# Patient Record
Sex: MALE | Race: BLACK OR AFRICAN AMERICAN | RURAL
[De-identification: names, ages, dates, MRNs, and addresses within clinical notes are randomized per-mention and may not be internally consistent; named-entity substitution may affect disease eponyms.]

---

## 2017-06-02 DIAGNOSIS — E11.9 TYPE 2 DIABETES MELLITUS WITHOUT COMPLICATION, WITH LONG-TERM CURRENT USE OF INSULIN (HCC): ICD-10-CM

## 2017-06-02 DIAGNOSIS — E11.9 TYPE 2 DIABETES MELLITUS WITHOUT COMPLICATION, UNSPECIFIED LONG TERM INSULIN USE STATUS: ICD-10-CM

## 2017-06-02 DIAGNOSIS — Z79.4 TYPE 2 DIABETES MELLITUS WITHOUT COMPLICATION, WITH LONG-TERM CURRENT USE OF INSULIN (HCC): ICD-10-CM

## 2017-06-07 ENCOUNTER — OFFICE VISIT (OUTPATIENT)
Dept: FAMILY MEDICINE CLINIC | Age: 82
End: 2017-06-07

## 2017-06-07 VITALS
RESPIRATION RATE: 16 BRPM | HEART RATE: 67 BPM | OXYGEN SATURATION: 98 % | DIASTOLIC BLOOD PRESSURE: 66 MMHG | WEIGHT: 201 LBS | TEMPERATURE: 98.3 F | HEIGHT: 69 IN | SYSTOLIC BLOOD PRESSURE: 138 MMHG | BODY MASS INDEX: 29.77 KG/M2

## 2017-06-07 DIAGNOSIS — Z13.31 SCREENING FOR DEPRESSION: ICD-10-CM

## 2017-06-07 DIAGNOSIS — I10 ESSENTIAL HYPERTENSION: ICD-10-CM

## 2017-06-07 DIAGNOSIS — E78.00 PURE HYPERCHOLESTEROLEMIA: ICD-10-CM

## 2017-06-07 DIAGNOSIS — Z13.39 SCREENING FOR ALCOHOLISM: ICD-10-CM
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
## 2017-06-07 DIAGNOSIS — E11.9 TYPE 2 DIABETES MELLITUS WITHOUT COMPLICATION, UNSPECIFIED LONG TERM INSULIN USE STATUS: ICD-10-CM

## 2017-06-07 DIAGNOSIS — Z12.5 SCREENING FOR PROSTATE CANCER: ICD-10-CM

## 2017-06-07 DIAGNOSIS — Z00.00 ROUTINE GENERAL MEDICAL EXAMINATION AT A HEALTH CARE FACILITY: ICD-10-CM

## 2017-06-07 NOTE — PROGRESS NOTES
Reviewed record in preparation for visit and have necessary documentation  Pt did not bring medication to office visit for review  Opportunity was given for questions  Goals that were addressed and/or need to be completed after this appointment include   Health Maintenance Due   Topic Date Due    EYE EXAM RETINAL OR DILATED Q1  06/04/2016    FOOT EXAM Q1  03/09/2017    MEDICARE YEARLY EXAM  03/10/2017    HEMOGLOBIN A1C Q6M  04/25/2017    GLAUCOMA SCREENING Q2Y  06/04/2017

## 2017-06-07 NOTE — MR AVS SNAPSHOT
Visit Information Date & Time Provider Department Dept. Phone Encounter #  
 6/7/2017  8:00 AM Chantal Christopher MD [address] [id number] Upcoming Health Maintenance Date Due  
 EYE EXAM RETINAL OR DILATED Q1 6/4/2016 FOOT EXAM Q1 3/9/2017 MEDICARE YEARLY EXAM 3/10/2017 HEMOGLOBIN A1C Q6M 4/25/2017 GLAUCOMA SCREENING Q2Y 6/4/2017 INFLUENZA AGE 9 TO ADULT 8/1/2017 MICROALBUMIN Q1 10/25/2017 LIPID PANEL Q1 10/25/2017 DTaP/Tdap/Td series (2 - Td) 7/6/2026 Allergies as of 6/7/2017  Review Complete On: 6/7/2017 By: Kojo Hubbard LPN No Known Allergies Current Immunizations  Reviewed on 10/1/2010 Name Date Influenza Vaccine (Quad) PF 10/25/2016 Pneumococcal Polysaccharide (PPSV-23) 3/9/2016 Pneumococcal Vaccine (Unspecified Type) 8/17/1998 TD Vaccine 8/17/1998 Tdap 7/6/2016 Not reviewed this visit You Were Diagnosed With   
  
 Codes Comments Falls, initial encounter    -  Primary ICD-10-CM: W19. Christella Blush ICD-9-CM: E888.9 Type 2 diabetes mellitus without complication, unspecified long term insulin use status     ICD-10-CM: E11.9 ICD-9-CM: 250.00 Essential hypertension     ICD-10-CM: I10 
ICD-9-CM: 401.9 Pure hypercholesterolemia     ICD-10-CM: E78.00 ICD-9-CM: 272.0 Routine general medical examination at a health care facility     ICD-10-CM: Z00.00 ICD-9-CM: V70.0 Screening for alcoholism     ICD-10-CM: Z13.89 ICD-9-CM: V79.1 Screening for prostate cancer     ICD-10-CM: Z12.5 ICD-9-CM: V76.44 Vitals BP Pulse Temp Resp Height(growth percentile) Weight(growth percentile)  
 138/66 (BP 1 Location: Right arm, BP Patient Position: Sitting) 67 98.3 °F (36.8 °C) (Oral) 16 5' 9\" (1.753 m) 201 lb (91.2 kg) SpO2 BMI Smoking Status 98% 29.68 kg/m2 Never Smoker Vitals History BMI and BSA Data Body Mass Index Body Surface Area 29.68 kg/m 2 2.11 m 2 Preferred Pharmacy Pharmacy Name Phone [address] [facility] [address] [phone] Your Updated Medication List  
  
   
This list is accurate as of: 6/7/17  9:05 AM.  Always use your most recent med list.  
  
  
  
  
 aspirin 325 mg tablet Commonly known as:  ASPIRIN Take 1 Tab by mouth daily. glipiZIDE SR 10 mg CR tablet Commonly known as:  GLUCOTROL XL  
TAKE 1 TABLET DAILY pravastatin 20 mg tablet Commonly known as:  PRAVACHOL  
TAKE 1 TABLET DAILY  
  
 sildenafil citrate 50 mg tablet Commonly known as:  VIAGRA Take 1 Tab by mouth as needed. SITagliptin-metFORMIN 50-1,000 mg per tablet Commonly known as:  JANUMET  
TAKE 1 TABLET TWICE A DAY WITH MEALS Prescriptions Sent to Pharmacy Refills SITagliptin-metFORMIN (JANUMET) 50-1,000 mg per tablet 0 Sig: TAKE 1 TABLET TWICE A DAY WITH MEALS Class: Normal  
 Pharmacy: [address] [place] [address] #: [phone] We Performed the Following AMB POC EKG ROUTINE W/ 12 LEADS, INTER & REP [48454 CPT(R)] CBC W/O DIFF [49985 CPT(R)] HEMOGLOBIN A1C WITH EAG [77363 CPT(R)] LIPID PANEL [95023 CPT(R)] METABOLIC PANEL, COMPREHENSIVE [62733 CPT(R)] PSA SCREENING (SCREENING) [ [facility]] TSH 3RD GENERATION [26140 CPT(R)] Patient Instructions Advance Directives: Care Instructions Your Care Instructions An advance directive is a legal way to state your wishes at the end of your life. It tells your family and your doctor what to do if you can no longer say what you want. There are two main types of advance directives. You can change them any time that your wishes change. · A living will tells your family and your doctor your wishes about life support and other treatment.  
· A durable power of  for health care lets you name a person to make treatment decisions for you when you can't speak for yourself. This person is called a health care agent. If you do not have an advance directive, decisions about your medical care may be made by a doctor or a  who doesn't know you. It may help to think of an advance directive as a gift to the people who care for you. If you have one, they won't have to make tough decisions by themselves. Follow-up care is a key part of your treatment and safety. Be sure to make and go to all appointments, and call your doctor if you are having problems. It's also a good idea to know your test results and keep a list of the medicines you take. How can you care for yourself at home? · Discuss your wishes with your loved ones and your doctor. This way, there are no surprises. · Many states have a unique form. Or you might use a universal form that has been approved by many states. This kind of form can sometimes be completed and stored online. Your electronic copy will then be available wherever you have a connection to the Internet. In most cases, doctors will respect your wishes even if you have a form from a different state. · You don't need a  to do an advance directive. But you may want to get legal advice. · Think about these questions when you prepare an advance directive: ¨ Who do you want to make decisions about your medical care if you are not able to? Many people choose a family member or close friend. ¨ Do you know enough about life support methods that might be used? If not, talk to your doctor so you understand. ¨ What are you most afraid of that might happen? You might be afraid of having pain, losing your independence, or being kept alive by machines. ¨ Where would you prefer to die? Choices include your home, a hospital, or a nursing home. ¨ Would you like to have information about hospice care to support you and your family? ¨ Do you want to donate organs when you die? ¨ Do you want certain Sabianism practices performed before you die? If so, put your wishes in the advance directive. · Read your advance directive every year, and make changes as needed. When should you call for help? Be sure to contact your doctor if you have any questions. Where can you learn more? Go to http://sepideh-sol.info/. Enter R264 in the search box to learn more about \"Advance Directives: Care Instructions. \" Current as of: November 17, 2016 Content Version: 11.2 © 5163-1867 [employer]. Care instructions adapted under license by [employer] (which disclaims liability or warranty for this information). If you have questions about a medical condition or this instruction, always ask your healthcare professional. [address] any warranty or liability for your use of this information. Introducing [facility]! Kang Paredes introduces [employer] patient portal. Now you can access parts of your medical record, email your doctor's office, and request medication refills online. 1. In your internet browser, go to [email or web address] 2. Click on the First Time User? Click Here link in the Sign In box. You will see the New Member Sign Up page. 3. Enter your [employer] Access Code exactly as it appears below. You will not need to use this code after youve completed the sign-up process. If you do not sign up before the expiration date, you must request a new code. · [employer] Access Code: [email or web address] Expires: 9/5/2017  7:58 AM 
 
4. Enter the last four digits of your Social Security Number (xxxx) and Date of Birth (mm/dd/yyyy) as indicated and click Submit. You will be taken to the next sign-up page. 5. Create a [employer] ID. This will be your [employer] login ID and cannot be changed, so think of one that is secure and easy to remember. 6. Create a [employer] password. You can change your password at any time. 7. Enter your Password Reset Question and Answer. This can be used at a later time if you forget your password. 8. Enter your e-mail address. You will receive e-mail notification when new information is available in [address] 9. Click Sign Up. You can now view and download portions of your medical record. 10. Click the Download Summary menu link to download a portable copy of your medical information. If you have questions, please visit the Frequently Asked Questions section of the [employer] website. Remember, [employer] is NOT to be used for urgent needs. For medical emergencies, dial 911. Now available from your iPhone and Android! Please provide this summary of care documentation to your next provider. Your primary care clinician is listed as Barry Rosado. If you have any questions after today's visit, please call [phone].

## 2017-06-07 NOTE — PROGRESS NOTES
This is a Subsequent Medicare Annual Wellness Visit providing Personalized Prevention Plan Services (PPPS) (Performed 12 months after initial AWV and PPPS )    I have reviewed the patient's medical history in detail and updated the computerized patient record. 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 90 Tab 3    glipiZIDE SR (GLUCOTROL XL) 10 mg CR tablet TAKE 1 TABLET DAILY 90 Tab 1    sildenafil citrate (VIAGRA) 50 mg tablet Take 1 Tab by mouth as needed. 3 Tab 32     No Known Allergies  Family History   Problem Relation Age of Onset    No Known Problems Mother     No Known Problems Father      Social History   Substance Use Topics    Smoking status: Never Smoker    Smokeless tobacco: Never Used    Alcohol use No     Patient Active Problem List   Diagnosis Code    DM type 2 [facility]diabetes mellitus, type 2) [facility] E11.9    HTN (hypertension) I10    Hyperlipemia E78.5    Peyronie disease N48.6    Anemia D64.9    PPD positive R76.11    Colon polyp K63.5       Depression Risk Factor Screening:     PHQ over the last two weeks 10/25/2016   Little interest or pleasure in doing things Not at all   Feeling down, depressed or hopeless Not at all   Total Score PHQ 2 0     Alcohol Risk Factor Screening:    On any occasion during the past 3 months, have you had more than 4 drinks containing alcohol? No    Do you average more than 14 drinks per week? No      Functional Ability and Level of Safety:     Hearing Loss   moderate    Activities of Daily Living   Self-care. Requires assistance with: no ADLs    Fall Risk     Fall Risk Assessment, last 12 mths 10/25/2016   Able to walk? Yes   Fall in past 12 months? No     Abuse Screen   Patient is not abused      Evaluation of Cognitive Function:  Mood/affect:  neutral  Appearance: age appropriate and casually dressed  Family member/caregiver input: none      Patient Care Team:  Princess Kiah DO as PCP - General (Family Practice)  Grecia Bach MD (Internal Medicine)    Advice/Referrals/Counseling   Education and counseling provided:  Are appropriate based on today's review and evaluation  End-of-Life planning (with patient's consent)      Assessment/Plan       ICD-10-CM ICD-9-CM    1. Routine general medical examination at a health care facility Z00.00 V70.0    2. Screening for alcoholism Z13.89 V79.1 GA ANNUAL ALCOHOL SCREEN 15 MIN   3. Screening for depression Z13.89 V79.0    4. Screening for prostate cancer Z12.5 V76.44 PSA SCREENING (SCREENING)   . The following medical evaluation and decision making is distinct and separate from the Annual Medicare Wellness Exam.      Patient: Trung Alexander MRN: [id number]  SSN: [id number]    YOB: 1931  Age: 80 y.o. Sex: male        Subjective:     Chief Complaint   Patient presents with    Diabetes    Cholesterol Problem    Hypertension    Annual Wellness Visit     due       HPI: he is a 80y.o. year old male who presents for with concern about recent falls. Patient with hx of DM2, HTN, HLD and anemia. Patient denies HA, dizziness, SOB, CP, abdominal pain, dysuria, myalgias or arthralgias. Diabetes: This patient is being treating under a comprehensive plan of care for diabetes.   Overall the patient feels well with good energy level. Insulin dependence: no   Pertinent Labs:   Lab Results   Component Value Date/Time    Hemoglobin A1c 7.9 06/07/2017 12:00 PM      Body mass index is 29.68 kg/(m^2). Lab Results   Component Value Date/Time    LDL, calculated 79 06/07/2017 12:00 PM        Dietary compliance: Good   Medication compliance:Good   On ASA: Yes        Wt Readings from Last 3 Encounters:   06/07/17 201 lb (91.2 kg)   10/25/16 195 lb (88.5 kg)   06/23/16 201 lb 3.2 oz (91.3 kg)        History   Smoking Status    Never Smoker   Smokeless Tobacco    Never Used        Medications, diet and exercise as means of diabetic control with a goal of an A1C of less than 7.0% discussed. Diabetic foot care and annual eye exam discussed as well. Check blood sugars while fasting just before breakfast on most days and occasionally before dinner. Write down readings in a diabetic log book and bring them to the next visit. Call the office for fasting sugars over 200 or below 75 on two or more occasions. Call immediately if having symptoms of high sugar (frequent urination, always thirsty) or low sugar (dizzy, lethargic, sweaty, nauseated, headache). Our overall goal is to reduce or eliminate the long term consequences of poorly controlled diabetes. Patient expresses understanding and agreement with our plan of care. Hypertension:  The patient reports:  taking medications as instructed, no medication side effects noted, no TIA's, no chest pain on exertion, no dyspnea on exertion, no swelling of ankles.     Lifestyle modification/social history: sedentary     BP Readings from Last 3 Encounters:   06/07/17 138/66   10/25/16 111/50   06/23/16 107/70     Lab Results   Component Value Date/Time    Sodium 137 06/07/2017 12:00 PM    Potassium 4.6 06/07/2017 12:00 PM    Chloride 95 06/07/2017 12:00 PM    CO2 24 06/07/2017 12:00 PM    Anion gap 7 11/02/2010 12:20 PM    Glucose 154 06/07/2017 12:00 PM    BUN 12 06/07/2017 12:00 PM    Creatinine 0.88 06/07/2017 12:00 PM    BUN/Creatinine ratio 14 06/07/2017 12:00 PM    GFR est AA 91 06/07/2017 12:00 PM    GFR est non-AA 78 06/07/2017 12:00 PM    Calcium 9.0 06/07/2017 12:00 PM     Patient advised to log blood pressures at home 2-3 times weekly and bring to next visit. Call office as soon as possible if BP's over 140/90 on multiple occasions or with symptoms of dizziness, chest pain, shortness of breath, headache or ankle swelling. Our goal is to normalize the blood pressure to decrease the risks of strokes and heart attacks. The patient is in agreement with the plan. Current and past medical information:    Current Medications after this visit[de-identified]     Current Outpatient Prescriptions   Medication Sig    SITagliptin-metFORMIN (JANUMET) 50-1,000 mg per tablet TAKE 1 TABLET TWICE A DAY WITH MEALS    pravastatin (PRAVACHOL) 20 mg tablet TAKE 1 TABLET DAILY    aspirin (ASPIRIN) 325 mg tablet Take 1 Tab by mouth daily.  glipiZIDE SR (GLUCOTROL XL) 10 mg CR tablet TAKE 1 TABLET DAILY    sildenafil citrate (VIAGRA) 50 mg tablet Take 1 Tab by mouth as needed. No current facility-administered medications for this visit.         Patient Active Problem List    Diagnosis Date Noted    DM type 2 (diabetes mellitus, type 2) ([facility] 04/09/2010    HTN (hypertension) 04/09/2010    Hyperlipemia 04/09/2010    Peyronie disease 04/09/2010    Anemia 04/09/2010    PPD positive 04/09/2010    Colon polyp 04/09/2010       Past Medical History:   Diagnosis Date    Anemia 4/9/2010    Colon polyp 4/9/2010    DM type 2 (diabetes mellitus, type 2) ([facility] 4/9/2010    HTN (hypertension) 4/9/2010    Hyperlipemia 4/9/2010    Peyronie disease 4/9/2010    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       No Known Allergies    Past Surgical History:   Procedure Laterality Date    COLONOSCOPY      ENDOSCOPY, COLON, DIAGNOSTIC  2007    Patient instructed to return in 3 Years/over-due    HX HEENT Social History     Social History    Marital status:      Spouse name: N/A    Number of children: N/A    Years of education: N/A     Social History Main Topics    Smoking status: Never Smoker    Smokeless tobacco: Never Used    Alcohol use No    Drug use: No    Sexual activity: Not Asked     Other Topics Concern    None     Social History Narrative         Objective:     Review of Systems:  Constitutional: Negative for fatigue or malaise  Derm: Negative for rash or lesion  HEENT: Negative for acute hearing or vision changes  Cardiovascular: Negative for dizziness, chest pain or palpitations  Respiratory: Negative for cough, wheezing or SOB  Gastreintestinal: Negative for nausea or abdominal pain  Genital/urinary: Negative for dysuria or voiding dysfunction  Muscoloskeletal: Negative for acute myalgias or arthralgias   Neurological: Negative for headache, weakness or paresthesia  Psychological: Negative for depression or anxiety      Vitals:    06/07/17 0808   BP: 138/66   Pulse: 67   Resp: 16   Temp: 98.3 °F (36.8 °C)   TempSrc: Oral   SpO2: 98%   Weight: 201 lb (91.2 kg)   Height: 5' 9\" (1.753 m)      Body mass index is 29.68 kg/(m^2).     Physical Exam:  Constitutional: well developed, well nourished, in no acute distress  Skin: warm and dry, normal tone and turgor  Head: normocephalic, atraumatic  Eyes: sclera clear, EOMI, PERRL  Neck: normal range of motion  Cardiovascular: normal S1, S2, regular rate and rhythm  Respiratory: clear to auscultation bilaterally with symmetrical effort  Abdomen: soft, BS normal  Extremities: full range of motion  Neurology: normal strength and sensation  Psych: active, alert and oriented, affect appropriate       Health Maintenance Due   Topic Date Due    EYE EXAM RETINAL OR DILATED Q1  06/04/2016    FOOT EXAM Q1  03/09/2017    MEDICARE YEARLY EXAM  03/10/2017    GLAUCOMA SCREENING Q2Y  06/04/2017       Risk, benefits and potential costs of recommended health maintenance discussed. Patient expressed understanding and declined at this time. Assessment and orders:       ICD-10-CM ICD-9-CM    1. Falls, initial encounter W19. XXXA E888.9 AMB POC EKG ROUTINE W/ 12 LEADS, INTER & REP      METABOLIC PANEL, COMPREHENSIVE      CBC W/O DIFF   2. Type 2 diabetes mellitus without complication, unspecified long term insulin use status M76.1 290.08 METABOLIC PANEL, COMPREHENSIVE      CBC W/O DIFF      HEMOGLOBIN A1C WITH EAG      LIPID PANEL      SITagliptin-metFORMIN (JANUMET) 50-1,000 mg per tablet      DISCONTINUED: SITagliptin-metFORMIN (JANUMET) 50-1,000 mg per tablet   3. Essential hypertension I10 401.9 AMB POC EKG ROUTINE W/ 12 LEADS, INTER & REP      METABOLIC PANEL, COMPREHENSIVE      LIPID PANEL      TSH 3RD GENERATION   4. Pure hypercholesterolemia C37.87 229.3 METABOLIC PANEL, COMPREHENSIVE      LIPID PANEL         Plan of care:  Diagnoses were discussed in detail with patient. Medication risks/benefits/side effects discussed with patient. All of the patient's questions were addressed and answered to apparent satisfaction. The patient understands and agrees with our plan of care. The patient knows to call back if they have questions about the plan of care or if symptoms change. The patient received an After-Visit Summary which contains VS, diagnoses, orders, allergy and medication lists. Patient Care Team:  Raissa Russo DO as PCP - General (Family Practice)  Piyush Pavon MD (Internal Medicine)    Follow-up Disposition:  Return in about 3 months (around 9/7/2017), or if symptoms worsen or fail to improve.     Future Appointments  Date Time Provider Otoniel Kaplan   10/5/2017 8:40 AM Stella Matthews MD [facility] REGINO SCHED       Signed By: Stella Matthews MD     June 17, 2017

## 2017-06-07 NOTE — PATIENT INSTRUCTIONS
Advance Directives: Care Instructions  Your Care Instructions  An advance directive is a legal way to state your wishes at the end of your life. It tells your family and your doctor what to do if you can no longer say what you want. There are two main types of advance directives. You can change them any time that your wishes change. · A living will tells your family and your doctor your wishes about life support and other treatment. · A durable power of  for health care lets you name a person to make treatment decisions for you when you can't speak for yourself. This person is called a health care agent. If you do not have an advance directive, decisions about your medical care may be made by a doctor or a  who doesn't know you. It may help to think of an advance directive as a gift to the people who care for you. If you have one, they won't have to make tough decisions by themselves. Follow-up care is a key part of your treatment and safety. Be sure to make and go to all appointments, and call your doctor if you are having problems. It's also a good idea to know your test results and keep a list of the medicines you take. How can you care for yourself at home? · Discuss your wishes with your loved ones and your doctor. This way, there are no surprises. · Many states have a unique form. Or you might use a universal form that has been approved by many states. This kind of form can sometimes be completed and stored online. Your electronic copy will then be available wherever you have a connection to the Internet. In most cases, doctors will respect your wishes even if you have a form from a different state. · You don't need a  to do an advance directive. But you may want to get legal advice. · Think about these questions when you prepare an advance directive:  ¨ Who do you want to make decisions about your medical care if you are not able to?  Many people choose a family member or close friend. ¨ Do you know enough about life support methods that might be used? If not, talk to your doctor so you understand. ¨ What are you most afraid of that might happen? You might be afraid of having pain, losing your independence, or being kept alive by machines. ¨ Where would you prefer to die? Choices include your home, a hospital, or a nursing home. ¨ Would you like to have information about hospice care to support you and your family? ¨ Do you want to donate organs when you die? ¨ Do you want certain Hinduism practices performed before you die? If so, put your wishes in the advance directive. · Read your advance directive every year, and make changes as needed. When should you call for help? Be sure to contact your doctor if you have any questions. Where can you learn more? Go to http://sepideh-sol.info/. Enter R264 in the search box to learn more about \"Advance Directives: Care Instructions. \"  Current as of: November 17, 2016  Content Version: 11.2  © 3478-5874 [employer]. Care instructions adapted under license by [employer] (which disclaims liability or warranty for this information). If you have questions about a medical condition or this instruction, always ask your healthcare professional. [address] any warranty or liability for your use of this information.

## 2017-06-08 LAB
ALBUMIN SERPL-MCNC: 4.2 G/DL (ref 3.5–4.7)
ALBUMIN/GLOB SERPL: 1.6 {RATIO} (ref 1.2–2.2)
ALP SERPL-CCNC: 78 IU/L (ref 39–117)
ALT SERPL-CCNC: 11 IU/L (ref 0–44)
AST SERPL-CCNC: 13 IU/L (ref 0–40)
BILIRUB SERPL-MCNC: 0.3 MG/DL (ref 0–1.2)
BUN SERPL-MCNC: 12 MG/DL (ref 8–27)
BUN/CREAT SERPL: 14 (ref 10–24)
CALCIUM SERPL-MCNC: 9 MG/DL (ref 8.6–10.2)
CHLORIDE SERPL-SCNC: 95 MMOL/L (ref 96–106)
CHOLEST SERPL-MCNC: 134 MG/DL (ref 100–199)
CO2 SERPL-SCNC: 24 MMOL/L (ref 18–29)
CREAT SERPL-MCNC: 0.88 MG/DL (ref 0.76–1.27)
ERYTHROCYTE [DISTWIDTH] IN BLOOD BY AUTOMATED COUNT: 13.9 % (ref 12.3–15.4)
EST. AVERAGE GLUCOSE BLD GHB EST-MCNC: 180 MG/DL
GLOBULIN SER CALC-MCNC: 2.7 G/DL (ref 1.5–4.5)
GLUCOSE SERPL-MCNC: 154 MG/DL (ref 65–99)
HBA1C MFR BLD: 7.9 % (ref 4.8–5.6)
HCT VFR BLD AUTO: 35.8 % (ref 37.5–51)
HDLC SERPL-MCNC: 44 MG/DL
HGB BLD-MCNC: 11.7 G/DL (ref 12.6–17.7)
LDLC SERPL CALC-MCNC: 79 MG/DL (ref 0–99)
MCH RBC QN AUTO: 27.1 PG (ref 26.6–33)
MCHC RBC AUTO-ENTMCNC: 32.7 G/DL (ref 31.5–35.7)
MCV RBC AUTO: 83 FL (ref 79–97)
PLATELET # BLD AUTO: 239 X10E3/UL (ref 150–379)
POTASSIUM SERPL-SCNC: 4.6 MMOL/L (ref 3.5–5.2)
PROT SERPL-MCNC: 6.9 G/DL (ref 6–8.5)
PSA SERPL-MCNC: 1.5 NG/ML (ref 0–4)
RBC # BLD AUTO: 4.32 X10E6/UL (ref 4.14–5.8)
SODIUM SERPL-SCNC: 137 MMOL/L (ref 134–144)
TRIGL SERPL-MCNC: 54 MG/DL (ref 0–149)
TSH SERPL DL<=0.005 MIU/L-ACNC: 1.53 UIU/ML (ref 0.45–4.5)
VLDLC SERPL CALC-MCNC: 11 MG/DL (ref 5–40)
WBC # BLD AUTO: 7.2 X10E3/UL (ref 3.4–10.8)

## 2017-10-09 ENCOUNTER — OFFICE VISIT (OUTPATIENT)
Dept: FAMILY MEDICINE CLINIC | Age: 82
End: 2017-10-09

## 2017-10-09 VITALS
TEMPERATURE: 98.2 F | RESPIRATION RATE: 20 BRPM | SYSTOLIC BLOOD PRESSURE: 120 MMHG | WEIGHT: 204.4 LBS | BODY MASS INDEX: 30.27 KG/M2 | HEIGHT: 69 IN | OXYGEN SATURATION: 97 % | DIASTOLIC BLOOD PRESSURE: 66 MMHG | HEART RATE: 66 BPM

## 2017-10-09 DIAGNOSIS — E78.2 MIXED HYPERLIPIDEMIA: ICD-10-CM

## 2017-10-09 DIAGNOSIS — E11.9 CONTROLLED TYPE 2 DIABETES MELLITUS WITHOUT COMPLICATION, WITHOUT LONG-TERM CURRENT USE OF INSULIN (HCC): Primary | ICD-10-CM

## 2017-10-09 DIAGNOSIS — I10 ESSENTIAL HYPERTENSION: ICD-10-CM

## 2017-10-09 RX ORDER — PRAVASTATIN SODIUM 20 MG/1
TABLET ORAL
Qty: 90 TAB | Refills: 0 | Status: SHIPPED | OUTPATIENT
Start: 2017-10-09 | End: 2017-10-09 | Stop reason: SDUPTHER

## 2017-10-09 RX ORDER — PRAVASTATIN SODIUM 20 MG/1
TABLET ORAL
Qty: 90 TAB | Refills: 1 | Status: SHIPPED | OUTPATIENT
Start: 2017-10-09 | End: 2018-01-01 | Stop reason: SDUPTHER

## 2017-10-09 RX ORDER — GLIPIZIDE 10 MG/1
TABLET, FILM COATED, EXTENDED RELEASE ORAL
Qty: 90 TAB | Refills: 1 | Status: SHIPPED | OUTPATIENT
Start: 2017-10-09 | End: 2018-05-15 | Stop reason: SDUPTHER

## 2017-10-09 RX ORDER — ASPIRIN 325 MG
325 TABLET ORAL DAILY
Qty: 90 TAB | Refills: 3 | Status: SHIPPED | OUTPATIENT
Start: 2017-10-09 | End: 2018-02-06

## 2017-10-09 NOTE — PROGRESS NOTES
Health Maintenance Due   Topic Date Due    EYE EXAM RETINAL OR DILATED Q1  06/04/2016    FOOT EXAM Q1  03/09/2017    GLAUCOMA SCREENING Q2Y  06/04/2017    INFLUENZA AGE 9 TO ADULT  08/01/2017    MICROALBUMIN Q1  10/25/2017       Diabetic Bundle:  LDL-79  A1C-7.9  BP-  Smoking?no  Anticoagulation medication? yes  Eye exam dilated?   Foot exam?

## 2017-10-09 NOTE — MR AVS SNAPSHOT
Visit Information Date & Time Provider Department Dept. Phone Encounter #  
 10/9/2017  4:00 PM Josette Bernardo MD [address] [id number] Upcoming Health Maintenance Date Due  
 EYE EXAM RETINAL OR DILATED Q1 6/4/2016 FOOT EXAM Q1 3/9/2017 GLAUCOMA SCREENING Q2Y 6/4/2017 INFLUENZA AGE 9 TO ADULT 8/1/2017 MICROALBUMIN Q1 10/25/2017 HEMOGLOBIN A1C Q6M 12/7/2017 LIPID PANEL Q1 6/7/2018 MEDICARE YEARLY EXAM 6/8/2018 DTaP/Tdap/Td series (2 - Td) 7/6/2026 Allergies as of 10/9/2017  Review Complete On: 10/9/2017 By: Jahaira Ramos No Known Allergies Current Immunizations  Reviewed on 10/1/2010 Name Date Influenza Vaccine (Quad) PF 10/25/2016 Pneumococcal Polysaccharide (PPSV-23) 3/9/2016 TD Vaccine 8/17/1998 Tdap 7/6/2016 ZZZ-RETIRED (DO NOT USE) Pneumococcal Vaccine (Unspecified Type) 8/17/1998 Not reviewed this visit You Were Diagnosed With   
  
 Codes Comments Controlled type 2 diabetes mellitus without complication, without long-term current use of insulin ([facility] [address]    -  Primary ICD-10-CM: E11.9 ICD-9-CM: 250.00 Mixed hyperlipidemia     ICD-10-CM: E78.2 ICD-9-CM: 272.2 Essential hypertension     ICD-10-CM: I10 
ICD-9-CM: 401.9 Vitals BP Pulse Temp Resp Height(growth percentile) Weight(growth percentile) 120/66 66 98.2 °F (36.8 °C) (Oral) 20 5' 9\" (1.753 m) 204 lb 6.4 oz (92.7 kg) SpO2 BMI Smoking Status 97% 30.18 kg/m2 Never Smoker Vitals History BMI and BSA Data Body Mass Index Body Surface Area  
 30.18 kg/m 2 2.12 m 2 [facility]erred Pharm[facility] Phone [address] [phone] Your Updated Medication List  
  
   
This list is accurate as of: 10/9/17  4:56 PM.  Always use your most recent med list.  
  
  
  
  
 aspirin 325 mg tablet Commonly known as:  ASPIRIN Take 1 Tab by mouth daily. glipiZIDE SR 10 mg CR tablet Commonly known as:  GLUCOTROL XL  
TAKE 1 TABLET DAILY pravastatin 20 mg tablet Commonly known as:  PRAVACHOL  
TAKE 1 TABLET DAILY  
  
 sildenafil citrate 50 mg tablet Commonly known as:  VIAGRA Take 1 Tab by mouth as needed. SITagliptin-metFORMIN 50-1,000 mg per tablet Commonly known as:  JANUMET  
TAKE 1 TABLET TWICE A DAY WITH MEALS Prescriptions Sent to Pharmacy Refills  
 pravastatin (PRAVACHOL) 20 mg tablet 1 Sig: TAKE 1 TABLET DAILY Class: Normal  
 Pharmacy: [address], [address] Ph #: [phone]  
 glipiZIDE SR (GLUCOTROL XL) 10 mg CR tablet 1 Sig: TAKE 1 TABLET DAILY Class: Normal  
 Pharmacy: [address], [address] Ph #: [phone] SITagliptin-metFORMIN (JANUMET) 50-1,000 mg per tablet 1 Sig: TAKE 1 TABLET TWICE A DAY WITH MEALS Class: Normal  
 Pharmacy: [address], [address] Ph #: [phone]  
 aspirin (ASPIRIN) 325 mg tablet 3 Sig: Take 1 Tab by mouth daily. Class: Normal  
 Pharmacy: [address], [address] Ph #: [phone] Route: Oral  
  
Patient Instructions Learning About Diabetes Food Guidelines Your Care Instructions Meal planning is important to manage diabetes. It helps keep your blood sugar at a target level (which you set with your doctor). You don't have to eat special foods. You can eat what your family eats, including sweets once in a while. But you do have to pay attention to how often you eat and how much you eat of certain foods. You may want to work with a dietitian or a certified diabetes educator (CDE) to help you plan meals and snacks. A dietitian or CDE can also help you lose weight if that is one of your goals. What should you know about eating carbs? Managing the amount of carbohydrate (carbs) you eat is an important part of healthy meals when you have diabetes. Carbohydrate is found in many foods. · Learn which foods have carbs. And learn the amounts of carbs in different foods. ¨ Bread, cereal, pasta, and rice have about 15 grams of carbs in a serving. A serving is 1 slice of bread (1 ounce), ½ cup of cooked cereal, or 1/3 cup of cooked pasta or rice. ¨ Fruits have 15 grams of carbs in a serving. A serving is 1 small fresh fruit, such as an apple or orange; ½ of a banana; ½ cup of cooked or canned fruit; ½ cup of fruit juice; 1 cup of melon or raspberries; or 2 tablespoons of dried fruit. ¨ Milk and no-sugar-added yogurt have 15 grams of carbs in a serving. A serving is 1 cup of milk or 2/3 cup of no-sugar-added yogurt. ¨ Starchy vegetables have 15 grams of carbs in a serving. A serving is ½ cup of mashed potatoes or sweet potato; 1 cup winter squash; ½ of a small baked potato; ½ cup of cooked beans; or ½ cup cooked corn or green peas. · Learn how much carbs to eat each day and at each meal. A dietitian or CDE can teach you how to keep track of the amount of carbs you eat. This is called carbohydrate counting. · If you are not sure how to count carbohydrate grams, use the Plate Method to plan meals. It is a good, quick way to make sure that you have a balanced meal. It also helps you spread carbs throughout the day. ¨ Divide your plate by types of foods. Put non-starchy vegetables on half the plate, meat or other protein food on one-quarter of the plate, and a grain or starchy vegetable in the final quarter of the plate.  To this you can add a small piece of fruit and 1 cup of milk or yogurt, depending on how many carbs you are supposed to eat at a meal. 
· Try to eat about the same amount of carbs at each meal. Do not \"save up\" your daily allowance of carbs to eat at one meal. 
 · Proteins have very little or no carbs per serving. Examples of proteins are beef, chicken, turkey, fish, eggs, tofu, cheese, cottage cheese, and peanut butter. A serving size of meat is 3 ounces, which is about the size of a deck of cards. Examples of meat substitute serving sizes (equal to 1 ounce of meat) are 1/4 cup of cottage cheese, 1 egg, 1 tablespoon of peanut butter, and ½ cup of tofu. How can you eat out and still eat healthy? · Learn to estimate the serving sizes of foods that have carbohydrate. If you measure food at home, it will be easier to estimate the amount in a serving of restaurant food. · If the meal you order has too much carbohydrate (such as potatoes, corn, or baked beans), ask to have a low-carbohydrate food instead. Ask for a salad or green vegetables. · If you use insulin, check your blood sugar before and after eating out to help you plan how much to eat in the future. · If you eat more carbohydrate at a meal than you had planned, take a walk or do other exercise. This will help lower your blood sugar. What else should you know? · Limit saturated fat, such as the fat from meat and dairy products. This is a healthy choice because people who have diabetes are at higher risk of heart disease. So choose lean cuts of meat and nonfat or low-fat dairy products. Use olive or canola oil instead of butter or shortening when cooking. · Don't skip meals. Your blood sugar may drop too low if you skip meals and take insulin or certain medicines for diabetes. · Check with your doctor before you drink alcohol. Alcohol can cause your blood sugar to drop too low. Alcohol can also cause a bad reaction if you take certain diabetes medicines. Follow-up care is a key part of your treatment and safety. Be sure to make and go to all appointments, and call your doctor if you are having problems. It's also a good idea to know your test results and keep a list of the medicines you take. Where can you learn more? Go to http://sepideh-sol.info/. Enter S410 in the search box to learn more about \"Learning About Diabetes Food Guidelines. \" Current as of: March 13, 2017 Content Version: 11.3 © 4139-1198 [employer]. Care instructions adapted under license by [employer] (which disclaims liability or warranty for this information). If you have questions about a medical condition or this instruction, always ask your healthcare professional. [address] any warranty or liability for your use of this information. Introducing [facility]! Chiojason Corbin introduces [employer] patient portal. Now you can access parts of your medical record, email your doctor's office, and request medication refills online. 1. In your internet browser, go to [email or web address] 2. Click on the First Time User? Click Here link in the Sign In box. You will see the New Member Sign Up page. 3. Enter your [employer] Access Code exactly as it appears below. You will not need to use this code after youve completed the sign-up process. If you do not sign up before the expiration date, you must request a new code. · [employer] Access Code: [employer] Expires: 1/7/2018  4:11 PM 
 
4. Enter the last four digits of your Social Security Number (xxxx) and Date of Birth (mm/dd/yyyy) as indicated and click Submit. You will be taken to the next sign-up page. 5. Create a [employer] ID. This will be your [employer] login ID and cannot be changed, so think of one that is secure and easy to remember. 6. Create a [employer] password. You can change your password at any time. 7. Enter your Password Reset Question and Answer. This can be used at a later time if you forget your password. 8. Enter your e-mail address. You will receive e-mail notification when new information is available in [address] 9. Click Sign Up. You can now view and download portions of your medical record. 10. Click the Download Summary menu link to download a portable copy of your medical information. If you have questions, please visit the Frequently Asked Questions section of the [employer] website. Remember, [employer] is NOT to be used for urgent needs. For medical emergencies, dial 911. Now available from your iPhone and Android! Please provide this summary of care documentation to your next provider. If you have any questions after today's visit, please call [phone].

## 2017-10-09 NOTE — PATIENT INSTRUCTIONS

## 2017-10-15 DIAGNOSIS — E78.2 MIXED HYPERLIPIDEMIA: ICD-10-CM

## 2017-10-15 DIAGNOSIS — I10 ESSENTIAL HYPERTENSION: ICD-10-CM

## 2017-10-15 DIAGNOSIS — E11.9 CONTROLLED TYPE 2 DIABETES MELLITUS WITHOUT COMPLICATION, WITHOUT LONG-TERM CURRENT USE OF INSULIN (HCC): ICD-10-CM

## 2017-10-15 NOTE — PROGRESS NOTES
Patient: Cruz Jenkins MRN: [id number]  SSN: [id number]    YOB: 1931  Age: 80 y.o. Sex: male        Subjective:     Chief Complaint   Patient presents with    Hypertension    Diabetes    Cholesterol Problem    Labs       HPI: he is a 80y.o. year old male who presents for follow up Patient with hx of DM2, HTN, HLD and anemia. Patient denies HA, dizziness, SOB, CP, abdominal pain, dysuria, myalgias or arthralgias. Patient not fasting for lab work. Diabetes: This patient is being treating under a comprehensive plan of care for diabetes. Overall the patient feels well with good energy level. Insulin dependence: no   Pertinent Labs:     Lab Results   Component Value Date/Time    Hemoglobin A1c 7.9 06/07/2017 12:00 PM      Body mass index is 30.18 kg/(m^2). Lab Results   Component Value Date/Time    LDL, calculated 79 06/07/2017 12:00 PM        Dietary compliance: Good   Medication compliance:Good   On ASA: Yes        Wt Readings from Last 3 Encounters:   10/09/17 204 lb 6.4 oz (92.7 kg)   06/07/17 201 lb (91.2 kg)   10/25/16 195 lb (88.5 kg)        History   Smoking Status    Never Smoker   Smokeless Tobacco    Never Used        Medications, diet and exercise as means of diabetic control with a goal of an A1C of less than 7.0% discussed. Diabetic foot care and annual eye exam discussed as well. Check blood sugars while fasting just before breakfast on most days and occasionally before dinner. Write down readings in a diabetic log book and bring them to the next visit. Call the office for fasting sugars over 200 or below 75 on two or more occasions. Call immediately if having symptoms of high sugar (frequent urination, always thirsty) or low sugar (dizzy, lethargic, sweaty, nauseated, headache). Our overall goal is to reduce or eliminate the long term consequences of poorly controlled diabetes.  Patient expresses understanding and agreement with our plan of care.    Hypertension:  The patient reports:  taking medications as instructed, no medication side effects noted, no TIA's, no chest pain on exertion, no dyspnea on exertion, no swelling of ankles. Lifestyle modification/social history: sedentary     BP Readings from Last 3 Encounters:   10/09/17 120/66   06/07/17 138/66   10/25/16 111/50     Patient advised to log blood pressures at home 2-3 times weekly and bring to next visit. Call office as soon as possible if BP's over 140/90 on multiple occasions or with symptoms of dizziness, chest pain, shortness of breath, headache or ankle swelling. Our goal is to normalize the blood pressure to decrease the risks of strokes and heart attacks. The patient is in agreement with the plan. Current and past medical information:    Current Medications after this visit[de-identified]     Current Outpatient Prescriptions   Medication Sig    pravastatin (PRAVACHOL) 20 mg tablet TAKE 1 TABLET DAILY    glipiZIDE SR (GLUCOTROL XL) 10 mg CR tablet TAKE 1 TABLET DAILY    SITagliptin-metFORMIN (JANUMET) 50-1,000 mg per tablet TAKE 1 TABLET TWICE A DAY WITH MEALS    aspirin (ASPIRIN) 325 mg tablet Take 1 Tab by mouth daily.  sildenafil citrate (VIAGRA) 50 mg tablet Take 1 Tab by mouth as needed. No current facility-administered medications for this visit.         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       No Known Allergies    Past Surgical History:   Procedure Laterality Date    COLONOSCOPY      ENDOSCOPY, COLON, DIAGNOSTIC  2007    Patient instructed to return in 3 Years/over-due    HX HEENT         Social History     Social History    Marital status:      Spouse name: N/A    Number of children: N/A    Years of education: N/A     Social History Main Topics    Smoking status: Never Smoker    Smokeless tobacco: Never Used    Alcohol use No    Drug use: No    Sexual activity: Not Asked     Other Topics Concern    None     Social History Narrative         Objective:     Review of Systems:  Constitutional: Negative for fatigue or malaise  Derm: Negative for rash or lesion  HEENT: Negative for acute hearing or vision changes  Cardiovascular: Negative for dizziness, chest pain or palpitations  Respiratory: Negative for cough, wheezing or SOB  Gastreintestinal: Negative for nausea or abdominal pain  Genital/urinary: Negative for dysuria or voiding dysfunction  Muscoloskeletal: Negative for acute myalgias or arthralgias   Neurological: Negative for headache, weakness or paresthesia  Psychological: Negative for depression or anxiety      Vitals:    10/09/17 1614   BP: 120/66   Pulse: 66   Resp: 20   Temp: 98.2 °F (36.8 °C)   TempSrc: Oral   SpO2: 97%   Weight: 204 lb 6.4 oz (92.7 kg)   Height: 5' 9\" (1.753 m)      Body mass index is 30.18 kg/(m^2).     Physical Exam:  Constitutional: well developed, well nourished, in no acute distress  Skin: warm and dry, normal tone and turgor  Head: normocephalic, atraumatic  Eyes: sclera clear, EOMI, PERRL  Neck: normal range of motion  Cardiovascular: normal S1, S2, regular rate and rhythm  Respiratory: clear to auscultation bilaterally with symmetrical effort  Abdomen: soft, BS normal  Extremities: full range of motion  Neurology: normal strength and sensation  Psych: active, alert and oriented, affect appropriate       Health Maintenance Due   Topic Date Due    EYE EXAM RETINAL OR DILATED Q1  06/04/2016    FOOT EXAM Q1  03/09/2017    GLAUCOMA SCREENING Q2Y  06/04/2017    INFLUENZA AGE 9 TO ADULT  08/01/2017    MICROALBUMIN Q1  10/25/2017       Risk, benefits and potential costs of recommended health maintenance discussed. Patient expressed understanding and declined at this time. Assessment and orders:     Diagnoses and all orders for this visit:    1. Controlled type 2 diabetes mellitus without complication, without long-term current use of insulin (HCC)  -     glipiZIDE SR (GLUCOTROL XL) 10 mg CR tablet; TAKE 1 TABLET DAILY  -     SITagliptin-metFORMIN (JANUMET) 50-1,000 mg per tablet; TAKE 1 TABLET TWICE A DAY WITH MEALS  -     METABOLIC PANEL, COMPREHENSIVE; Future  -     HEMOGLOBIN A1C WITH EAG; Future    2. Mixed hyperlipidemia  -     pravastatin (PRAVACHOL) 20 mg tablet; TAKE 1 TABLET DAILY  -     LIPID PANEL; Future  -     METABOLIC PANEL, COMPREHENSIVE; Future    3. Essential hypertension  -     METABOLIC PANEL, COMPREHENSIVE; Future  -     TSH 3RD GENERATION; Future    Other orders  -     aspirin (ASPIRIN) 325 mg tablet; Take 1 Tab by mouth daily. Plan of care:  Diagnoses were discussed in detail with patient. Medication risks/benefits/side effects discussed with patient. All of the patient's questions were addressed and answered to apparent satisfaction. The patient understands and agrees with our plan of care. The patient knows to call back if they have questions about the plan of care or if symptoms change. The patient received an After-Visit Summary which contains VS, diagnoses, orders, allergy and medication lists.       Patient Care Team:  Alexey Valente MD (Internal Medicine)    Follow-up Disposition: Not on File    Future Appointments  Date Time Provider Otoniel Kaplan   2/9/2018 8:40 AM Jose M Ramirez MD [facility] 10.       Signed By: Jose M Ramirez MD     October 15, 2017

## 2017-10-18 ENCOUNTER — TELEPHONE (OUTPATIENT)
Dept: FAMILY MEDICINE CLINIC | Age: 82
End: 2017-10-18

## 2017-10-18 NOTE — TELEPHONE ENCOUNTER
Per Dr. Bernal Spine, \"    Patient does not need omega 3 as HLD well controlled.  He does not need to check BS at home

## 2017-10-18 NOTE — TELEPHONE ENCOUNTER
Yuri Ramriez with [facility] called to check the status of the request that was sent on 10/10/17 for Omega 3 and alcohol pads.  Please advise [phone]

## 2018-01-01 ENCOUNTER — OFFICE VISIT (OUTPATIENT)
Dept: FAMILY MEDICINE CLINIC | Age: 83
End: 2018-01-01

## 2018-01-01 ENCOUNTER — TELEPHONE (OUTPATIENT)
Dept: FAMILY MEDICINE CLINIC | Age: 83
End: 2018-01-01

## 2018-01-01 VITALS
HEART RATE: 78 BPM | SYSTOLIC BLOOD PRESSURE: 139 MMHG | HEIGHT: 69 IN | BODY MASS INDEX: 29.62 KG/M2 | DIASTOLIC BLOOD PRESSURE: 63 MMHG | OXYGEN SATURATION: 98 % | TEMPERATURE: 97.6 F | WEIGHT: 200 LBS | RESPIRATION RATE: 20 BRPM

## 2018-01-01 DIAGNOSIS — Z00.00 MEDICARE ANNUAL WELLNESS VISIT, SUBSEQUENT: ICD-10-CM

## 2018-01-01 DIAGNOSIS — Z13.39 SCREENING FOR ALCOHOLISM: ICD-10-CM

## 2018-01-01 DIAGNOSIS — M25.562 ACUTE PAIN OF LEFT KNEE: ICD-10-CM

## 2018-01-01 DIAGNOSIS — E11.9 CONTROLLED TYPE 2 DIABETES MELLITUS WITHOUT COMPLICATION, WITHOUT LONG-TERM CURRENT USE OF INSULIN (HCC): ICD-10-CM

## 2018-01-01 DIAGNOSIS — Z13.31 SCREENING FOR DEPRESSION: ICD-10-CM

## 2018-01-01 DIAGNOSIS — E78.2 MIXED HYPERLIPIDEMIA: ICD-10-CM

## 2018-01-01 DIAGNOSIS — E11.21 TYPE 2 DIABETES WITH NEPHROPATHY (HCC): Primary | ICD-10-CM

## 2018-01-01 DIAGNOSIS — I10 ESSENTIAL HYPERTENSION: ICD-10-CM

## 2018-01-01 DIAGNOSIS — E78.00 PURE HYPERCHOLESTEROLEMIA: ICD-10-CM

## 2018-01-01 LAB
ALBUMIN SERPL-MCNC: 4.3 G/DL (ref 3.5–4.7)
ALBUMIN/GLOB SERPL: 1.7 {RATIO} (ref 1.2–2.2)
ALP SERPL-CCNC: 74 IU/L (ref 39–117)
ALT SERPL-CCNC: 16 IU/L (ref 0–44)
AST SERPL-CCNC: 15 IU/L (ref 0–40)
BILIRUB SERPL-MCNC: <0.2 MG/DL (ref 0–1.2)
BUN SERPL-MCNC: 11 MG/DL (ref 8–27)
BUN/CREAT SERPL: 11 (ref 10–24)
CALCIUM SERPL-MCNC: 9.1 MG/DL (ref 8.6–10.2)
CHLORIDE SERPL-SCNC: 102 MMOL/L (ref 96–106)
CHOLEST SERPL-MCNC: 162 MG/DL (ref 100–199)
CO2 SERPL-SCNC: 24 MMOL/L (ref 20–29)
CREAT SERPL-MCNC: 1.02 MG/DL (ref 0.76–1.27)
EST. AVERAGE GLUCOSE BLD GHB EST-MCNC: 171 MG/DL
GFR SERPLBLD CREATININE-BSD FMLA CKD-EPI: 66 ML/MIN/1.73
GFR SERPLBLD CREATININE-BSD FMLA CKD-EPI: 77 ML/MIN/1.73
GLOBULIN SER CALC-MCNC: 2.6 G/DL (ref 1.5–4.5)
GLUCOSE SERPL-MCNC: 180 MG/DL (ref 65–99)
HBA1C MFR BLD: 7.6 % (ref 4.8–5.6)
HDLC SERPL-MCNC: 50 MG/DL
LDLC SERPL CALC-MCNC: 90 MG/DL (ref 0–99)
POTASSIUM SERPL-SCNC: 4.4 MMOL/L (ref 3.5–5.2)
PROT SERPL-MCNC: 6.9 G/DL (ref 6–8.5)
SODIUM SERPL-SCNC: 143 MMOL/L (ref 134–144)
TRIGL SERPL-MCNC: 110 MG/DL (ref 0–149)
TSH SERPL DL<=0.005 MIU/L-ACNC: 1.61 UIU/ML (ref 0.45–4.5)
VLDLC SERPL CALC-MCNC: 22 MG/DL (ref 5–40)

## 2018-01-01 RX ORDER — PRAVASTATIN SODIUM 20 MG/1
TABLET ORAL
Qty: 90 TAB | Refills: 1 | Status: SHIPPED | OUTPATIENT
Start: 2018-01-01 | End: 2018-01-01 | Stop reason: SDUPTHER

## 2018-01-01 RX ORDER — SITAGLIPTIN AND METFORMIN HYDROCHLORIDE 50; 1000 MG/1; MG/1
TABLET, FILM COATED ORAL
Qty: 180 TAB | Refills: 0 | Status: SHIPPED | OUTPATIENT
Start: 2018-01-01 | End: 2019-01-01 | Stop reason: SDUPTHER

## 2018-01-01 RX ORDER — GLIPIZIDE 10 MG/1
TABLET, FILM COATED, EXTENDED RELEASE ORAL
Qty: 30 TAB | Refills: 0 | Status: SHIPPED | OUTPATIENT
Start: 2018-01-01 | End: 2019-01-01 | Stop reason: SDUPTHER

## 2018-01-01 RX ORDER — NAPROXEN 375 MG/1
375 TABLET ORAL 2 TIMES DAILY WITH MEALS
Qty: 60 TAB | Refills: 0 | Status: SHIPPED | OUTPATIENT
Start: 2018-01-01

## 2018-02-01 ENCOUNTER — TELEPHONE (OUTPATIENT)
Dept: FAMILY MEDICINE CLINIC | Age: 83
End: 2018-02-01

## 2018-02-01 ENCOUNTER — PATIENT OUTREACH (OUTPATIENT)
Dept: FAMILY MEDICINE CLINIC | Age: 83
End: 2018-02-01

## 2018-02-01 NOTE — PROGRESS NOTES
[address] 1/27-1/30/2018 Streptococcal pneumonia; Sepsis    NN unable to reach patient at any listed number. Voicemail left requesting return call from contact on [employer].

## 2018-02-01 NOTE — TELEPHONE ENCOUNTER
[facility]/[place]/ called to let us know that Pt did not want Home Health.

## 2018-02-05 ENCOUNTER — PATIENT OUTREACH (OUTPATIENT)
Dept: FAMILY MEDICINE CLINIC | Age: 83
End: 2018-02-05

## 2018-02-05 NOTE — PROGRESS NOTES
[address] 1/27-1/30/2018 Sepsis, PNA    NN has been unable to reach patient by phone to complete JUAN ALBERTO. PCP appointment 2/6/2018. NN will attempt to see patient at that time.

## 2018-02-06 ENCOUNTER — HOSPITAL ENCOUNTER (OUTPATIENT)
Dept: LAB | Age: 83
Discharge: HOME | End: 2018-02-06
Payer: MEDICARE

## 2018-02-06 ENCOUNTER — OFFICE VISIT (OUTPATIENT)
Dept: FAMILY MEDICINE CLINIC | Age: 83
End: 2018-02-06

## 2018-02-06 ENCOUNTER — PATIENT OUTREACH (OUTPATIENT)
Dept: FAMILY MEDICINE CLINIC | Age: 83
End: 2018-02-06

## 2018-02-06 VITALS
DIASTOLIC BLOOD PRESSURE: 55 MMHG | HEIGHT: 69 IN | RESPIRATION RATE: 20 BRPM | WEIGHT: 197.2 LBS | HEART RATE: 65 BPM | OXYGEN SATURATION: 99 % | BODY MASS INDEX: 29.21 KG/M2 | SYSTOLIC BLOOD PRESSURE: 138 MMHG | TEMPERATURE: 97.2 F

## 2018-02-06 DIAGNOSIS — E78.00 PURE HYPERCHOLESTEROLEMIA: ICD-10-CM

## 2018-02-06 DIAGNOSIS — E11.8 TYPE 2 DIABETES MELLITUS WITH COMPLICATION, WITH LONG-TERM CURRENT USE OF INSULIN (HCC): ICD-10-CM

## 2018-02-06 DIAGNOSIS — J18.9 PNEUMONIA DUE TO INFECTIOUS ORGANISM, UNSPECIFIED LATERALITY, UNSPECIFIED PART OF LUNG: Primary | ICD-10-CM

## 2018-02-06 DIAGNOSIS — I10 ESSENTIAL HYPERTENSION: ICD-10-CM

## 2018-02-06 DIAGNOSIS — Z79.4 TYPE 2 DIABETES MELLITUS WITH COMPLICATION, WITH LONG-TERM CURRENT USE OF INSULIN (HCC): ICD-10-CM

## 2018-02-06 LAB
ALBUMIN UR QL STRIP: 80 MG/L
BILIRUB UR QL STRIP: NEGATIVE
CREATININE, URINE POC: 300 MG/DL
GLUCOSE UR-MCNC: NORMAL MG/DL
HBA1C MFR BLD HPLC: 7.8 %
KETONES P FAST UR STRIP-MCNC: NORMAL MG/DL
MICROALBUMIN/CREAT RATIO POC: NORMAL MG/G
PH UR STRIP: 6 [PH] (ref 4.6–8)
PROT UR QL STRIP: NORMAL
SP GR UR STRIP: 1.02 (ref 1–1.03)
UA UROBILINOGEN AMB POC: NORMAL (ref 0.2–1)
URINALYSIS CLARITY POC: CLEAR
URINALYSIS COLOR POC: YELLOW
URINE BLOOD POC: NEGATIVE
URINE LEUKOCYTES POC: NEGATIVE
URINE NITRITES POC: NEGATIVE

## 2018-02-06 PROCEDURE — 83036 HEMOGLOBIN GLYCOSYLATED A1C: CPT

## 2018-02-06 PROCEDURE — 36415 COLL VENOUS BLD VENIPUNCTURE: CPT

## 2018-02-06 RX ORDER — CEFPROZIL 500 MG/1
500 TABLET, FILM COATED ORAL 2 TIMES DAILY
Qty: 20 TAB | Refills: 0 | Status: SHIPPED | OUTPATIENT
Start: 2018-02-06 | End: 2018-02-06 | Stop reason: SDUPTHER

## 2018-02-06 RX ORDER — BENZONATATE 200 MG/1
200 CAPSULE ORAL
Qty: 30 CAP | Refills: 0 | Status: SHIPPED | OUTPATIENT
Start: 2018-02-06 | End: 2018-02-16

## 2018-02-06 RX ORDER — BENZONATATE 200 MG/1
200 CAPSULE ORAL
Qty: 30 CAP | Refills: 0 | Status: SHIPPED | OUTPATIENT
Start: 2018-02-06 | End: 2018-02-06 | Stop reason: SDUPTHER

## 2018-02-06 NOTE — PROGRESS NOTES
[address] 1/27-1/30/2018 Streptococcal pneumonia; Sepsis; MINAL    TRANSITIONS OF CARE NOTE     NN visited with patient during PCP visit today. Please note functional assessment. Patient states, \"I'm feeling much better overall. I just still have a cough with some green phlegm\". Patient denies fever, chills, nausea, vomiting, chest pain, sob or inability to take medications. NN discussed red flags with patient who verbalized understanding of when to seek immediate medical attention. Red flags/sepsis: fever or below normal temperature (97f), chills, nausea, vomiting, diarrhea, chest pain, shortness of breath, unable to take medications, unusual sensations, and BFAST. Patient did not get rx for 5 remaining days of Ceftin post discharge. He was unaware of this. PCP to prescribe 10 day course of abx today. Patient understands to take this. Patient's BS found to be 47 day of admission. Patient does not recall if he missed breakfast that day. Last A1c was 7.9 June 2017. Labs ordered by PCP today including A1c. Patient reports he checks BS every morning and they range from  usually. He does not recall ever having hypoglycemia in the past.  NN reviewed rule of 15 with patient in the event of hypoglycemic episodes in the future. Patient verbalized understanding. Blood cultures drawn, but were not reported at discharge. They have been requested for PCP review. RRAT score: admitted to outside hospital    Advance Medical Directive on file in EMR? no     Barriers to care?  none    Support System consists of: family    [address] [place] [address], if so what agency?  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 Pt's TCM follow up appt is scheduled with Dr. Asya Kaur on 2/6/2018, which is within 7 days of discharge. Fall Risk Assessment:    Fall Risk Assessment, last 12 mths 10/25/2016   Able to walk? Yes   Fall in past 12 months? No   Fall with injury? -   Number of falls in past 12 months -   Fall Risk Score -     Patient presenting symptoms (referenced by Rena Jauregui MD): Patient presents with complaints of confusion. The patient otherwise lives with his grandson but is independent, ambulates without assistance and drives. He supposedly was found in his [employer] parking lot in his care, confused. He was noted to have a blood sugar of 47. Here in the emergency department, he states he is feeling well and has no acute complaints. He denies pain anywhere. He is a little bit more confused than baseline, as per the family, but otherwise is conversing appropriately. He has no acute complaints at this time, denies chest pain, shortness of breath, abdominal pain, nausea, vomiting, diarrhea, or constipation. He does admit to a cough for the past couple of days. Of note, here in the emergency department he was found to be febrile. His chest x-ray was negative, but given the history of cough, it was suspicious for early pneumonia. Course of current Hospitalization (referenced by Erick Lopez MD note dated 1/30/2018): The patient presented to the emergency department with complaints primarily of decreased responsiveness. It was noted in the ER that he likely had a pneumonia based on his symptoms of cough and fever. This was confirmed by repeat x-ray. Also was confirmed by strep urine antigen which was positive for Streptococcus pneumonia. He was treated with Rocephin and azithromycin initially. He overall had a good response to treatment. On the day of discharge, he was afebrile over the last 24 hours.      Lab/Diagnostics at time of Discharge:   Hgb 9.6, WBC 4.5, plt 131. Chest x-ray again showed the right medial lung base infiltrate. Medication Reconciliation completed: yes    Patient Goals:  Goals      Knowledge and adherence of prescribed medication (ie. action, side effects, missed dose, etc.). Patient understands he needs to complete course of abx.  Prevent complications post hospitalization. Patient did not get and complete remainder of Ceftin post discharge of 5 remaining days. PCP to start 10 day course of abx today.  Understands red flags post discharge. Red flags/sepsis: fever or below normal temperature (97f), chills, nausea, vomiting, diarrhea, chest pain, shortness of breath, unable to take medications, unusual sensations, and BFAST. NN remains available to patient.

## 2018-02-06 NOTE — PROGRESS NOTES
Patient: Shantell Myers MRN: [id number]  SSN: [id number]    YOB: 1931  Age: 80 y.o. Sex: male        Subjective:     Chief Complaint   Patient presents with   [facility] Follow Up     Pneumonia        HPI: he is a 80y.o. year old male who presents for follow up of admission to hospital for SIRS. She swas in [facility] 1/27-1/30/2018 with discharge diagnoses of Streptococcal pneumonia; Sepsis and MINAL. Patient was prescibed antibiotic and tamiflu upon discharge. He says he did not get these and does not recall having any paper work upon discharge. Call to pharmacy confirms that these medications were filled. Patient with hx of DM2, HTN, HLD and anemia. He says he has only been taking janumet once daily instead of twice daily as prescribed. He complains of productive cough. Patient denies F/C, HA, dizziness, SOB, CP, abdominal pain, dysuria, acute myalgias or arthralgias. He does not have his medications with him. Patient's difficulty with memory a concern. Will get SLUMS at follow up in one week. Diabetes: This patient is being treating under a comprehensive plan of care for diabetes. Overall the patient feels well with good energy level. Insulin dependence: no   Pertinent Labs:     Lab Results   Component Value Date/Time    Hemoglobin A1c 7.9 (H) 06/07/2017 12:00 PM    Hemoglobin A1c (POC) 7.8 02/06/2018 11:15 AM       Body mass index is 29.12 kg/(m^2). Wt Readings from Last 3 Encounters:   02/06/18 197 lb 3.2 oz (89.4 kg)   10/09/17 204 lb 6.4 oz (92.7 kg)   06/07/17 201 lb (91.2 kg)        History   Smoking Status    Never Smoker   Smokeless Tobacco    Never Used        Medications, diet and exercise as means of diabetic control with a goal of an A1C of less than 7.0% discussed. Diabetic foot care and annual eye exam discussed as well. Check blood sugars while fasting just before breakfast on most days and occasionally before dinner.   Write down readings in a diabetic log book and bring them to the next visit. Call the office for fasting sugars over 200 or below 75 on two or more occasions. Call immediately if having symptoms of high sugar (frequent urination, always thirsty) or low sugar (dizzy, lethargic, sweaty, nauseated, headache). Our overall goal is to reduce or eliminate the long term consequences of poorly controlled diabetes. Patient expresses understanding and agreement with our plan of care. Hypertension:  The patient reports:  taking medications as instructed, no medication side effects noted, no TIA's, no chest pain on exertion, no dyspnea on exertion, no swelling of ankles. Lifestyle modification/social history: sedentary     BP Readings from Last 3 Encounters:   02/06/18 138/55   10/09/17 120/66   06/07/17 138/66     Patient advised to log blood pressures at home 2-3 times weekly and bring to next visit. Call office as soon as possible if BP's over 140/90 on multiple occasions or with symptoms of dizziness, chest pain, shortness of breath, headache or ankle swelling. Our goal is to normalize the blood pressure to decrease the risks of strokes and heart attacks. The patient is in agreement with the plan. Current and past medical information:    Current Medications after this visit[de-identified]     Current Outpatient Prescriptions   Medication Sig    SITagliptin-metFORMIN (JANUMET) 50-1,000 mg per tablet TAKE 1 TABLET TWICE A DAY WITH MEALS    cefPROZIL (CEFZIL) 500 mg tablet Take 1 Tab by mouth two (2) times a day for 10 days.  benzonatate (TESSALON) 200 mg capsule Take 1 Cap by mouth three (3) times daily as needed for Cough for up to 10 days.  pravastatin (PRAVACHOL) 20 mg tablet TAKE 1 TABLET DAILY    glipiZIDE SR (GLUCOTROL XL) 10 mg CR tablet TAKE 1 TABLET DAILY    aspirin (ASPIRIN) 325 mg tablet Take 1 Tab by mouth daily. No current facility-administered medications for this visit.         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    Colon polyp 04/09/2010       Past Medical History:   Diagnosis Date    Anemia 4/9/2010    Colon polyp 4/9/2010    DM type 2 (diabetes mellitus, type 2) ([facility] 4/9/2010    HTN (hypertension) 4/9/2010    Hyperlipemia 4/9/2010    Peyronie disease 4/9/2010    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       No Known Allergies    Past Surgical History:   Procedure Laterality Date    COLONOSCOPY      ENDOSCOPY, COLON, DIAGNOSTIC  2007    Patient instructed to return in 3 Years/over-due    HX HEENT         Social History     Social History    Marital status:      Spouse name: N/A    Number of children: N/A    Years of education: N/A     Social History Main Topics    Smoking status: Never Smoker    Smokeless tobacco: Never Used    Alcohol use No    Drug use: No    Sexual activity: Not Asked     Other Topics Concern    None     Social History Narrative         Objective:     Review of Systems:  Constitutional: see HPI, Positive for fatigue  Derm: Negative for rash or lesion  HEENT: Negative for acute hearing or vision changes  Cardiovascular: Negative for dizziness, chest pain or palpitations  Respiratory: Positive for productive cough, Negative for wheezing or SOB  Gastreintestinal: Negative for nausea or abdominal pain  Genital/urinary: Negative for dysuria or voiding dysfunction  Muscoloskeletal: Negative for acute myalgias or arthralgias   Neurological: Negative for headache, weakness or paresthesia  Psychological: Negative for depression or anxiety      Vitals:    02/06/18 1018   BP: 138/55   Pulse: 65   Resp: 20   Temp: 97.2 °F (36.2 °C)   TempSrc: Oral   SpO2: 99%   Weight: 197 lb 3.2 oz (89.4 kg)   Height: 5' 9\" (1.753 m)      Body mass index is 29.12 kg/(m^2).     Physical Exam:  Constitutional: well developed, well nourished, in no acute distress  Skin: warm and dry, normal tone and turgor  Head: normocephalic, atraumatic  Eyes: sclera clear, EOMI, PERRL  Neck: normal range of motion  Cardiovascular: normal S1, S2, regular rate and rhythm  Respiratory: clear to auscultation bilaterally with symmetrical effort  Abdomen: soft, BS normal  Extremities: full range of motion  Neurology: normal strength and sensation  Psych: active, alert and oriented, affect appropriate       Health Maintenance Due   Topic Date Due    EYE EXAM RETINAL OR DILATED Q1  06/04/2016    FOOT EXAM Q1  03/09/2017    GLAUCOMA SCREENING Q2Y  06/04/2017    Influenza Age 9 to Adult  08/01/2017       Risk, benefits and potential costs of recommended health maintenance discussed. Patient expressed understanding and declined at this time. Assessment and orders:     Diagnoses and all orders for this visit:    1. Pneumonia due to infectious organism, unspecified laterality, unspecified part of lung  -     XR CHEST PA LAT; Future  -     benzonatate (TESSALON) 200 mg capsule; Take 1 Cap by mouth three (3) times daily as       needed for Cough for up to 10 days. 2. Type 2 diabetes mellitus with complication, with long-term current use of insulin (HCC)  -     AMB POC HEMOGLOBIN A1C  -     AMB POC URINALYSIS DIP STICK AUTO W/O MICRO  -     AMB POC URINE, MICROALBUMIN, SEMIQUANT (3 RESULTS)  -     SITagliptin-metFORMIN (JANUMET) 50-1,000 mg per tablet; TAKE 1 TABLET TWICE A DAY              WITH MEALS    3. Essential hypertension  -     AMB POC URINE, MICROALBUMIN, SEMIQUANT (3 RESULTS)        Continue current prescribed medications as written. No HTN medication changes at this time. 4. Pure hypercholesterolemia        -     Continue current prescribed medication as written. Will get FLP at f/u appointment. Plan of care:  Available hospital records reviewed. Diagnoses were discussed in detail with patient. Medication risks/benefits/side effects discussed with patient. All of the patient's questions were addressed and answered to apparent satisfaction. The patient understands and agrees with our plan of care. The patient knows to call back if they have questions about the plan of care or if symptoms change. The patient received an After-Visit Summary which contains VS, diagnoses, orders, allergy and medication lists. Patient Care Team:  Mckenzie Freeman MD (Internal Medicine)  Demetrice Watkins RN as Ambulatory Care Navigator    Follow-up Disposition:  Return in about 2 weeks (around 2/20/2018), or if symptoms worsen or fail to improve.     Future Appointments  Date Time Provider Otoniel Kaplan   2/9/2018 8:40 AM Sylvia Brower MD [facility]   2/20/2018 2:00 PM Sylvia Brower MD [facility]       Signed By: Sylvia Brower MD     February 6, 2018

## 2018-02-06 NOTE — PATIENT INSTRUCTIONS

## 2018-02-06 NOTE — PROGRESS NOTES
Reviewed record in preparation for visit and have necessary documentation  Pt did not bring medication to office visit for review    Goals that were addressed and/or need to be completed during or after this appointment include   Health Maintenance Due   Topic Date Due    EYE EXAM RETINAL OR DILATED Q1  06/04/2016    FOOT EXAM Q1  03/09/2017    GLAUCOMA SCREENING Q2Y  06/04/2017    Influenza Age 9 to Adult  08/01/2017    MICROALBUMIN Q1  10/25/2017    HEMOGLOBIN A1C Q6M  12/07/2017

## 2018-02-07 ENCOUNTER — PATIENT OUTREACH (OUTPATIENT)
Dept: FAMILY MEDICINE CLINIC | Age: 83
End: 2018-02-07

## 2018-02-07 LAB
EST. AVERAGE GLUCOSE BLD GHB EST-MCNC: 180 MG/DL
HBA1C MFR BLD: 7.9 % (ref 4.8–5.6)

## 2018-02-07 NOTE — PROGRESS NOTES
NN contacted patient's granddaughter, Raymond Babinski ([facility]) at providers request with concerns for patient safety and memory loss. Please see physician documentation from 2/6/2018 office visit. Per Rosaura Gonzalez, \"He has been forgetful for a while, but much worse lately. He was so confused and agitated during his hospitalization, they had to call me up there really early one morning to calm him down. He's been getting his bills mixed up\". She reports that patient's grandson lives with him and works nights. Another family member stays overnight. They have not helped him with his medications in the past, but are willing to do so now. Granddaughter will accompany patient next visit 2/9/2018 and is also requesting referral to neurology for further testing for dementia.

## 2018-02-09 ENCOUNTER — OFFICE VISIT (OUTPATIENT)
Dept: FAMILY MEDICINE CLINIC | Age: 83
End: 2018-02-09

## 2018-02-09 VITALS
RESPIRATION RATE: 20 BRPM | SYSTOLIC BLOOD PRESSURE: 137 MMHG | HEART RATE: 68 BPM | OXYGEN SATURATION: 97 % | DIASTOLIC BLOOD PRESSURE: 74 MMHG | TEMPERATURE: 97.2 F | BODY MASS INDEX: 29.33 KG/M2 | HEIGHT: 69 IN | WEIGHT: 198 LBS

## 2018-02-09 DIAGNOSIS — R41.3 MEMORY DIFFICULTY: Primary | ICD-10-CM

## 2018-02-09 RX ORDER — MEMANTINE HYDROCHLORIDE 5 MG/1
5 TABLET ORAL DAILY
Qty: 30 TAB | Refills: 2 | Status: SHIPPED | OUTPATIENT
Start: 2018-02-09 | End: 2018-02-20 | Stop reason: SDUPTHER

## 2018-02-09 RX ORDER — MEMANTINE HYDROCHLORIDE 5 MG/1
5 TABLET ORAL DAILY
Qty: 30 TAB | Refills: 2 | Status: SHIPPED | OUTPATIENT
Start: 2018-02-09 | End: 2018-02-09 | Stop reason: SDUPTHER

## 2018-02-09 NOTE — PROGRESS NOTES
Progress Note    Patient: Rajinder Hernandez MRN: [id number]  SSN: [id number]    YOB: 1931  Age: 80 y.o. Sex: male        Chief Complaint   Patient presents with    Memory Loss    Referral Request     neuro      he is a 80y.o. year old male who presents for concerns about memory. Granddaughter says he has been more forgetful and has had difficulties keeping up with bills and finances. Lives with grand son who has reported similar concerns. Patient with recent confusion over medications prescribed post hospitalization. Says he did not get prescriptions when pharmacy confirmed these had been filled. Will get SLUMS today. SLUMS score: 134    BP Readings from Last 3 Encounters:   02/09/18 137/74   02/06/18 138/55   10/09/17 120/66     Wt Readings from Last 3 Encounters:   02/09/18 198 lb (89.8 kg)   02/06/18 197 lb 3.2 oz (89.4 kg)   10/09/17 204 lb 6.4 oz (92.7 kg)     Body mass index is 29.24 kg/(m^2). Encounter Diagnoses   Name Primary?  Memory difficulty Yes       Patient Active Problem List   Diagnosis Code    DM type 2 (diabetes mellitus, type 2) ([facility] E11.9    HTN (hypertension) I10    Hyperlipemia E78.5    Peyronie disease N48.6    Anemia D64.9    PPD positive R76.11    Colon polyp K63.5     Past Surgical History:   Procedure Laterality Date    COLONOSCOPY      ENDOSCOPY, COLON, DIAGNOSTIC  2007    Patient instructed to return in 3 Years/over-due    HX HEENT       Social History     Social History    Marital status:      Spouse name: N/A    Number of children: N/A    Years of education: N/A     Occupational History    Not on file.      Social History Main Topics    Smoking status: Never Smoker    Smokeless tobacco: Never Used    Alcohol use No    Drug use: No    Sexual activity: Not on file     Other Topics Concern    Not on file     Social History Narrative     Family History   Problem Relation Age of Onset    No Known Problems Mother     No Known Problems Father      Current Outpatient Prescriptions   Medication Sig    memantine (NAMENDA) 5 mg tablet Take 1 Tab by mouth daily.  SITagliptin-metFORMIN (JANUMET) 50-1,000 mg per tablet TAKE 1 TABLET TWICE A DAY WITH MEALS    benzonatate (TESSALON) 200 mg capsule Take 1 Cap by mouth three (3) times daily as needed for Cough for up to 10 days.  pravastatin (PRAVACHOL) 20 mg tablet TAKE 1 TABLET DAILY    glipiZIDE SR (GLUCOTROL XL) 10 mg CR tablet TAKE 1 TABLET DAILY     No current facility-administered medications for this visit. No Known Allergies    Review of Systems:  Constitutional: Negative for fatigue, malaise  Resp: Negative for cough, wheezing or SOB  CV: Negative for chest pain, dizziness or palpitations  GI: Negative for nausea or abdominal pain  MS: Negative for acute myalgias or arthralgias   Neuro: Negative for HA, weakness or paresthesia  Psych: Negative for depression or anxiety     Vitals:    02/09/18 0853   BP: 137/74   Pulse: 68   Resp: 20   Temp: 97.2 °F (36.2 °C)   TempSrc: Oral   SpO2: 97%   Weight: 198 lb (89.8 kg)   Height: 5' 9\" (1.753 m)       Physical Examination:  General: Well developed, well nourished, in no acute distress  Head: Normocephalic, atraumatic  Eyes: Sclera clear, EOMI  Neck: Normal range of motion  Respiratory: symmetrical, unlabored effort  Cardiovascular: Regular rate and rhythm  Extremities: Full range of motion, normal gait  Neurologic: No focal deficits  Psych: Active, alert and oriented. Affect appropriate       ICD-10-CM ICD-9-CM    1. Memory difficulty R41.3 780.93        I have discussed the diagnosis with the patient and his grand-daughter and the intended plan as seen in the above orders. The patient expresses understanding and agreement with our plan of care. All of the patient's questions were answered to apparent satisfaction. The patient has received an after-visit summary.    The patient knows to call our office if there are any questions or concerns regarding diagnosis and treatment plans. I have discussed medication side effects and warnings with the patient as well. Over half of the 25 minutes face to face with Erik Shetty and his grand-daughter consisted of counseling and discussing treatment plans in reference to his memory loss and home safety. Follow-up Disposition:  Return in about 2 weeks (around 2/23/2018), or if symptoms worsen or fail to improve.

## 2018-02-09 NOTE — MR AVS SNAPSHOT
[address] [facility] [address] 
 
 
 [address] [facility] [address] [address] 
[phone] Patient: Jonathan Dickson MRN: [id number] SHAYLA:[id number] Visit Information Date & Time Provider Department Dept. Phone Encounter #  
 2/9/2018  8:40 AM Darci Ramsay MD [address]guero [id number] Follow-up Instructions Return in about 2 weeks (around 2/23/2018), or if symptoms worsen or fail to improve. Your Appointments 2/20/2018  2:00 PM  
ROUTINE CARE with Darci Ramsay MD  
[address]) Appt Note: 2 week fu /est pt  
 [address] [facility] [address] [address]  
[place] [address] [place] [id number] Upcoming Health Maintenance Date Due  
 EYE EXAM RETINAL OR DILATED Q1 6/4/2016 FOOT EXAM Q1 3/9/2017 GLAUCOMA SCREENING Q2Y 6/4/2017 Influenza Age 5 to Adult 8/1/2017 LIPID PANEL Q1 6/7/2018 MEDICARE YEARLY EXAM 6/8/2018 HEMOGLOBIN A1C Q6M 8/6/2018 MICROALBUMIN Q1 2/6/2019 DTaP/Tdap/Td series (2 - Td) 7/6/2026 Allergies as of 2/9/2018  Review Complete On: 2/9/2018 By: Darci Ramsay MD  
 No Known Allergies Current Immunizations  Reviewed on 10/1/2010 Name Date Influenza Vaccine (Quad) PF 10/25/2016 Pneumococcal Polysaccharide (PPSV-23) 3/9/2016 TD Vaccine 8/17/1998 Tdap 7/6/2016 ZZZ-RETIRED (DO NOT USE) Pneumococcal Vaccine (Unspecified Type) 8/17/1998 Not reviewed this visit You Were Diagnosed With   
  
 Codes Comments Memory difficulty    -  Primary ICD-10-CM: R41.3 ICD-9-CM: 780.93 Vitals BP Pulse Temp Resp Height(growth percentile) Weight(growth percentile)  
 137/74 68 97.2 °F (36.2 °C) (Oral) 20 5' 9\" (1.753 m) 198 lb (89.8 kg) SpO2 BMI Smoking Status 97% 29.24 kg/m2 Never Smoker BMI and BSA Data Body Mass Index Body Surface Area 29.24 kg/m 2 2.09 m 2 Preferred Pharmacy Pharmacy Name Phone [address] [facility] [place], [place] - [address] [place] [address] [phone] Your Updated Medication List  
  
   
This list is accurate as of: 2/9/18  9:40 AM.  Always use your most recent med list.  
  
  
  
  
 benzonatate 200 mg capsule Commonly known as:  TESSALON Take 1 Cap by mouth three (3) times daily as needed for Cough for up to 10 days. glipiZIDE SR 10 mg CR tablet Commonly known as:  GLUCOTROL XL  
TAKE 1 TABLET DAILY  
  
 memantine 5 mg tablet Commonly known as:  Terrell Maroon Take 1 Tab by mouth daily. pravastatin 20 mg tablet Commonly known as:  PRAVACHOL  
TAKE 1 TABLET DAILY SITagliptin-metFORMIN 50-1,000 mg per tablet Commonly known as:  JANUMET  
TAKE 1 TABLET TWICE A DAY WITH MEALS Prescriptions Sent to Pharmacy Refills  
 memantine (NAMENDA) 5 mg tablet 2 Sig: Take 1 Tab by mouth daily. Class: Normal  
 Pharmacy: [address] [place] [address] #: [phone] Route: Oral  
  
Follow-up Instructions Return in about 2 weeks (around 2/23/2018), or if symptoms worsen or fail to improve. Introducing [facility]! Butch Nelson introduces [employer] patient portal. Now you can access parts of your medical record, email your doctor's office, and request medication refills online. 1. In your internet browser, go to [email or web address] 2. Click on the First Time User? Click Here link in the Sign In box. You will see the New Member Sign Up page. 3. Enter your [employer] Access Code exactly as it appears below. You will not need to use this code after youve completed the sign-up process. If you do not sign up before the expiration date, you must request a new code. · [employer] Access Code: [email or web address] Expires: 5/7/2018 10:12 AM 
 
 4. Enter the last four digits of your Social Security Number (xxxx) and Date of Birth (mm/dd/yyyy) as indicated and click Submit. You will be taken to the next sign-up page. 5. Create a [employer] ID. This will be your [employer] login ID and cannot be changed, so think of one that is secure and easy to remember. 6. Create a [employer] password. You can change your password at any time. 7. Enter your Password Reset Question and Answer. This can be used at a later time if you forget your password. 8. Enter your e-mail address. You will receive e-mail notification when new information is available in [address] 9. Click Sign Up. You can now view and download portions of your medical record. 10. Click the Download Summary menu link to download a portable copy of your medical information. If you have questions, please visit the Frequently Asked Questions section of the [employer] website. Remember, [employer] is NOT to be used for urgent needs. For medical emergencies, dial 911. Now available from your iPhone and Android! Please provide this summary of care documentation to your next provider. If you have any questions after today's visit, please call [phone].

## 2018-02-09 NOTE — PATIENT INSTRUCTIONS
Dementia: Care Instructions  Your Care Instructions    Dementia is a loss of mental skills that affects your daily life. It is different than the occasional trouble with memory that is part of aging. You may find it hard to remember things that you feel you should be able to remember. Or you may feel that your mind is just not working as well as usual.  Finding out that you have dementia is a shock. You may be afraid and worried about how the condition will change your life. Although there is no cure at this time, medicine may slow memory loss and improve thinking for a while. Other medicines may be able to help you sleep or cope with depression and behavior changes. Dementia often gets worse slowly. But it can get worse quickly. As dementia gets worse, it may become harder to do common things that take planning, like making a list and going shopping. Over time, the disease may make it hard for you to take care of yourself. Some people with dementia need others to help care for them. Dementia is different for everyone. You may be able to function well for a long time. In the early stage of the condition, you can do things at home to make life easier and safer. You also can keep doing your hobbies and other activities. Many people find comfort in planning now for their future needs. Follow-up care is a key part of your treatment and safety. Be sure to make and go to all appointments, and call your doctor if you are having problems. It's also a good idea to know your test results and keep a list of the medicines you take. How can you care for yourself at home? · Take your medicines exactly as prescribed. Call your doctor if you think you are having a problem with your medicine. · Eat healthy foods. Eat lots of whole grains, fruits, and vegetables every day.  If you are not hungry, try snacks or nutritional drinks such as Boost, Ensure, or Sustacal.  · If you have problems sleeping:  ¨ Try not to nap too close to your bedtime. ¨ Exercise regularly. Walking is a good choice. ¨ Try a glass of warm milk or caffeine-free herbal tea before bed. · Do tasks and activities during the time of day when you feel your best. It may help to develop a daily routine. · Post labels, lists, and sticky notes to help you remember things. Write your activities on a calendar you can easily find. Put your clock where you can easily see it. · Stay active. Take walks in familiar places, or with friends or loved ones. Try to stay active mentally too. Read and work crossword puzzles if you enjoy these activities. · Do not drive unless you can pass an on-road driving test. If you are not sure if you are safe to drive, your state 's license bureau can test you. · Keep a cordless phone and a flashlight with new batteries by your bed. If possible, put a phone in each of the main rooms of your house, or carry a cell phone in case you fall and cannot reach a phone. Or, you can wear a device around your neck or wrist. You push a button that sends a signal for help. Acknowledge your emotions and plan for the future  · Talk openly and honestly with your doctor. · Let yourself grieve. It is common to feel angry, scared, frustrated, anxious, or depressed. · Get emotional support from family, friends, a support group, or a counselor experienced in working with people who have dementia. · Ask for help if you need it. · Plan for the future. ¨ Talk to your family and doctor about preparing a living will and other important papers while you can make decisions. These papers tell your doctors how to care for you at the end of your life. ¨ Consider naming a person to make decisions about your care if you are not able to. When should you call for help? Call 911 anytime you think you may need emergency care. For example, call if:  ? · You are lost and do not know whom to call. ? · You are injured and do not know whom to call.    ?Call your doctor now or seek immediate medical care if:  ? · You are more confused or upset than usual.   ? · You feel like you could hurt yourself because your mind is not working well. ? Watch closely for changes in your health, and be sure to contact your doctor if you have any problems. Where can you learn more? Go to http://speideh-sol.info/. Enter X652 in the search box to learn more about \"Dementia: Care Instructions. \"  Current as of: May 12, 2017  Content Version: 11.4  © 9206-9334 [employer]. Care instructions adapted under license by [employer] (which disclaims liability or warranty for this information). If you have questions about a medical condition or this instruction, always ask your healthcare professional. [address] any warranty or liability for your use of this information.

## 2018-02-09 NOTE — PROGRESS NOTES
Health Maintenance Due   Topic Date Due    EYE EXAM RETINAL OR DILATED Q1  06/04/2016    FOOT EXAM Q1  03/09/2017    GLAUCOMA SCREENING Q2Y  06/04/2017    Influenza Age 9 to Adult  08/01/2017     There is no height or weight on file to calculate BMI. 1. Have you been to the ER, urgent care clinic since your last visit? Hospitalized since your last visit? No    2. Have you seen or consulted any other health care providers outside of the [address] [place] [address] since your last visit? Include any pap smears or colon screening. No  Reviewed record in preparation for visit and have necessary documentation  Pt did not bring medication to office visit for review  Information was given to pt on Advanced Directives, Living Will  Information was given on Shingles Vaccine  opportunity was given for questions  Goals that were addressed and/or need to be completed during or after this appointment include       - check for functional glucose monitor and record keeping system  Pt was given BS record log to document home readings and return to office for review  Diabetic Bundle:  LDL-79  A1C-7.9  BP-  Smoking?no  Anticoagulation medication? yes  Eye exam dilated?   Foot exam?

## 2018-02-20 ENCOUNTER — OFFICE VISIT (OUTPATIENT)
Dept: FAMILY MEDICINE CLINIC | Age: 83
End: 2018-02-20

## 2018-02-20 VITALS
OXYGEN SATURATION: 96 % | BODY MASS INDEX: 28.68 KG/M2 | HEIGHT: 69 IN | SYSTOLIC BLOOD PRESSURE: 109 MMHG | WEIGHT: 193.6 LBS | HEART RATE: 94 BPM | RESPIRATION RATE: 20 BRPM | TEMPERATURE: 97.8 F | DIASTOLIC BLOOD PRESSURE: 58 MMHG

## 2018-02-20 DIAGNOSIS — F03.90 DEMENTIA WITHOUT BEHAVIORAL DISTURBANCE, UNSPECIFIED DEMENTIA TYPE: Primary | ICD-10-CM

## 2018-02-20 RX ORDER — MEMANTINE HYDROCHLORIDE 10 MG/1
10 TABLET ORAL DAILY
Qty: 30 TAB | Refills: 2 | Status: SHIPPED | OUTPATIENT
Start: 2018-02-20 | End: 2018-01-01

## 2018-02-20 NOTE — MR AVS SNAPSHOT
[address] [address] 
[phone] Patient: Octavio Cárdenas MRN: [id number] VHO:[birth year] Visit Information Date & Time Provider Department Dept. Phone Encounter #  
 2/20/2018  2:00 PM Lita Leslie, [address] [place] [address] [id number] Follow-up Instructions Return in about 2 months (around 4/20/2018). Upcoming Health Maintenance Date Due  
 EYE EXAM RETINAL OR DILATED Q1 6/4/2016 FOOT EXAM Q1 3/9/2017 GLAUCOMA SCREENING Q2Y 6/4/2017 Influenza Age 5 to Adult 8/1/2017 LIPID PANEL Q1 6/7/2018 MEDICARE YEARLY EXAM 6/8/2018 HEMOGLOBIN A1C Q6M 8/6/2018 MICROALBUMIN Q1 2/6/2019 DTaP/Tdap/Td series (2 - Td) 7/6/2026 Allergies as of 2/20/2018  Review Complete On: 2/20/2018 By: Santos Rios No Known Allergies Current Immunizations  Reviewed on 10/1/2010 Name Date Influenza Vaccine (Quad) PF 10/25/2016 Pneumococcal Polysaccharide (PPSV-23) 3/9/2016 TD Vaccine 8/17/1998 Tdap 7/6/2016 ZZZ-RETIRED (DO NOT USE) Pneumococcal Vaccine (Unspecified Type) 8/17/1998 Not reviewed this visit You Were Diagnosed With   
  
 Codes Comments Memory difficulty     ICD-10-CM: R41.3 ICD-9-CM: 780.93 Vitals BP Pulse Temp Resp Height(growth percentile) Weight(growth percentile) 109/58 94 97.8 °F (36.6 °C) (Oral) 20 5' 9\" (1.753 m) 193 lb 9.6 oz (87.8 kg) SpO2 BMI Smoking Status 96% 28.59 kg/m2 Never Smoker Vitals History BMI and BSA Data Body Mass Index Body Surface Area 28.59 kg/m 2 2.07 m 2 Preferred [facility] Name Phone [address] [address] [phone] Your Updated Medication List  
  
   
This list is accurate as of: 2/20/18  2:38 PM.  Always use your most recent med list.  
  
  
  
  
 glipiZIDE SR 10 mg CR tablet Commonly known as:  GLUCOTROL XL  
TAKE 1 TABLET DAILY  
  
 memantine 10 mg tablet Commonly known as:  [place] Take 1 Tab by mouth daily. pravastatin 20 mg tablet Commonly known as:  PRAVACHOL  
TAKE 1 TABLET DAILY SITagliptin-metFORMIN 50-1,000 mg per tablet Commonly known as:  JANUMET  
TAKE 1 TABLET TWICE A DAY WITH MEALS Prescriptions Sent to Pharmacy Refills  
 memantine (NAMENDA) 10 mg tablet 2 Sig: Take 1 Tab by mouth daily. Class: Normal  
 Pharmacy: [address] [place] [address] #: [phone] Route: Oral  
  
Follow-up Instructions Return in about 2 months (around 4/20/2018). Introducing [facility]! [facility] introduces So1 patient portal. Now you can access parts of your medical record, email your doctor's office, and request medication refills online. 1. In your internet browser, go to [email or web address] 2. Click on the First Time User? Click Here link in the Sign In box. You will see the New Member Sign Up page. 3. Enter your So1 Access Code exactly as it appears below. You will not need to use this code after youve completed the sign-up process. If you do not sign up before the expiration date, you must request a new code. · So1 Access Code: [email or web address] Expires: 5/7/2018 10:12 AM 
 
4. Enter the last four digits of your Social Security Number (xxxx) and Date of Birth (mm/dd/yyyy) as indicated and click Submit. You will be taken to the next sign-up page. 5. Create a [employer] ID. This will be your So1 login ID and cannot be changed, so think of one that is secure and easy to remember. 6. Create a C[employer] password. You can change your password at any time. 7. Enter your Password Reset Question and Answer. This can be used at a later time if you forget your password. 8. Enter your e-mail address.  You will receive e-mail notification when new information is available in [employer]. 9. Click Sign Up. You can now view and download portions of your medical record. 10. Click the Download Summary menu link to download a portable copy of your medical information. If you have questions, please visit the Frequently Asked Questions section of the [employer] website. Remember, [employer] is NOT to be used for urgent needs. For medical emergencies, dial 911. Now available from your iPhone and Android! Please provide this summary of care documentation to your next provider. If you have any questions after today's visit, please call [phone].

## 2018-02-20 NOTE — PROGRESS NOTES
Health Maintenance Due   Topic Date Due    EYE EXAM RETINAL OR DILATED Q1  06/04/2016    FOOT EXAM Q1  03/09/2017    GLAUCOMA SCREENING Q2Y  06/04/2017    Influenza Age 9 to Adult  08/01/2017     Body mass index is 28.59 kg/(m^2). 1. Have you been to the ER, urgent care clinic since your last visit? Hospitalized since your last visit? No    2. Have you seen or consulted any other health care providers outside of the [address] [place] [address] since your last visit? Include any pap smears or colon screening. No  Reviewed record in preparation for visit and have necessary documentation  Pt did not bring medication to office visit for review  Information was given to pt on Advanced Directives, Living Will  Information was given on Shingles Vaccine  opportunity was given for questions  Goals that were addressed and/or need to be completed during or after this appointment include       - check for functional glucose monitor and record keeping system  Pt was given BS record log to document home readings and return to office for review  Diabetic Bundle:  LDL-79  A1C-7.9  BP-  Smoking?no  Anticoagulation medication? Eye exam dilated?   Foot exam?

## 2018-02-21 NOTE — PROGRESS NOTES
Progress Note    Patient: Rupert Osullivan MRN: [id number]  SSN: [id number]    YOB: 1931  Age: 80 y.o. Sex: male        Chief Complaint   Patient presents with    Memory Loss    Follow-up     he is a 80y.o. year old male who presents for follow up of dementia. Patient presents alone. Cannot recall his medications. Says he is taking these as prescribed. Denies any side effect from medication. Will increase memantine to 10 mg daily. Patient feeling good sans other complaints or concerns at this time. SLUMS score: 13    BP Readings from Last 3 Encounters:   02/20/18 109/58   02/09/18 137/74   02/06/18 138/55     Wt Readings from Last 3 Encounters:   02/20/18 193 lb 9.6 oz (87.8 kg)   02/09/18 198 lb (89.8 kg)   02/06/18 197 lb 3.2 oz (89.4 kg)     Body mass index is 28.59 kg/(m^2). Encounter Diagnoses   Name Primary? 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      Social History Main Topics    Smoking status: Never Smoker    Smokeless tobacco: Never Used    Alcohol use No    Drug use: No    Sexual activity: Not on file     Other Topics Concern    Not on file     Social History Narrative     Family History   Problem Relation Age of Onset    No Known Problems Mother     No Known Problems Father      Current Outpatient Prescriptions   Medication Sig    memantine (NAMENDA) 10 mg tablet Take 1 Tab by mouth daily.  SITagliptin-metFORMIN (JANUMET) 50-1,000 mg per tablet TAKE 1 TABLET TWICE A DAY WITH MEALS    pravastatin (PRAVACHOL) 20 mg tablet TAKE 1 TABLET DAILY    glipiZIDE SR (GLUCOTROL XL) 10 mg CR tablet TAKE 1 TABLET DAILY     No current facility-administered medications for this visit. No Known Allergies    Review of Systems:  Constitutional: Negative for fatigue, malaise  Resp: Negative for cough, wheezing or SOB  CV: Negative for chest pain, dizziness or palpitations  GI: Negative for nausea or abdominal pain  MS: Negative for acute myalgias or arthralgias   Neuro: Negative for HA, weakness or paresthesia  Psych: Negative for depression or anxiety     Vitals:    02/20/18 1417   BP: 109/58   Pulse: 94   Resp: 20   Temp: 97.8 °F (36.6 °C)   TempSrc: Oral   SpO2: 96%   Weight: 193 lb 9.6 oz (87.8 kg)   Height: 5' 9\" (1.753 m)       Physical Examination:  General: Well developed, well nourished, in no acute distress  Head: Normocephalic, atraumatic  Eyes: Sclera clear, EOMI  Neck: Normal range of motion  Respiratory: symmetrical, unlabored effort  Cardiovascular: Regular rate and rhythm  Extremities: Full range of motion, normal gait  Neurologic: No focal deficits  Psych: Active, alert. Affect appropriate       ICD-10-CM ICD-9-CM    1. Dementia without behavioral disturbance, unspecified dementia type F03.90 294.20 memantine (NAMENDA) 10 mg tablet       I have discussed the diagnosis with the patient  and the intended plan as seen in the above orders. The patient expresses understanding and agreement with our plan of care. All of the patient's questions were answered to apparent satisfaction. The patient has received an after-visit summary. The patient knows to call our office if there are any questions or concerns regarding diagnosis and treatment plans. I have discussed medication side effects and warnings with the patient as well.   Over half of the 15 minutes face to face with Gretchen Pisano consisted of counseling and discussing treatment plans in reference to his memory loss and home safety. Follow-up Disposition:  Return in about 2 months (around 4/20/2018).

## 2018-03-08 ENCOUNTER — OFFICE VISIT (OUTPATIENT)
Dept: FAMILY MEDICINE CLINIC | Age: 83
End: 2018-03-08

## 2018-03-08 VITALS
TEMPERATURE: 98.2 F | DIASTOLIC BLOOD PRESSURE: 70 MMHG | OXYGEN SATURATION: 99 % | HEART RATE: 73 BPM | SYSTOLIC BLOOD PRESSURE: 140 MMHG | WEIGHT: 200 LBS | BODY MASS INDEX: 29.62 KG/M2 | HEIGHT: 69 IN | RESPIRATION RATE: 18 BRPM

## 2018-03-08 DIAGNOSIS — M77.9 TENDONITIS: Primary | ICD-10-CM

## 2018-03-08 DIAGNOSIS — M79.642 LEFT HAND PAIN: ICD-10-CM

## 2018-03-08 RX ORDER — NAPROXEN 500 MG/1
500 TABLET ORAL 2 TIMES DAILY WITH MEALS
Qty: 28 TAB | Refills: 0 | Status: SHIPPED | OUTPATIENT
Start: 2018-03-08 | End: 2018-01-01 | Stop reason: SDUPTHER

## 2018-03-08 NOTE — PROGRESS NOTES
Rajinder Hernandez  80 y.o. male  8/12/1931  [address]  [phone]     [facility]: Progress Note       Encounter Date: 3/8/2018    Chief Complaint   Patient presents with    Hand Pain     swelling and pain Left hand, 3 days       History provided by patient  History of Present Illness   Rajinder Hernandez is a 80 y.o. male who presents to clinic today for:    Left hand pain  Patient present with cc of left hand pain and swelling. Has not taken any medication. Pain is worse with picking up item or moving wrist (abduction and adduction.) Pain worse over CMC joint. Denies trauma, falls or recent injuries. Review of Systems   Review of Systems   Constitutional: Negative for chills and fever. Musculoskeletal: Positive for joint pain. Negative for back pain, falls, myalgias and neck pain. Skin: Negative for itching and rash. Neurological: Negative for tingling, sensory change, focal weakness, seizures and loss of consciousness. Vitals/Objective:     Vitals:    03/08/18 1440   BP: 140/70   Pulse: 73   Resp: 18   Temp: 98.2 °F (36.8 °C)   TempSrc: Oral   SpO2: 99%   Weight: 200 lb (90.7 kg)   Height: 5' 9\" (1.753 m)     Body mass index is 29.53 kg/(m^2). Wt Readings from Last 3 Encounters:   03/08/18 200 lb (90.7 kg)   02/20/18 193 lb 9.6 oz (87.8 kg)   02/09/18 198 lb (89.8 kg)       Physical Exam   Constitutional: He appears well-developed and well-nourished. Cardiovascular: Normal rate and regular rhythm.     Pulmonary/Chest: Effort normal and breath sounds normal.   Wrist: left  Wrist Effusion: None  Deformity: swelling over 5th CMC    ROM:  Flexion:full  Extension:full  Ulna deviation:full induced tendered over 5th CMC  Radial deviation:full induced tendered over 5th [facility]    Palpation:  Snuff box tenderness: None  TFCC tenderness: None  Ulna styloid tenderness: None  Distal radius tenderness: None  Hook of [place] tenderness: None  Second compartment (intersection) tenderness: None    Other test:  Finkelsteins test: Negative  Phalens test: Negative  Tinels test: Negative  Ulnar sided compression test: Negative  Gonzalezs test: Negative    Strength (0-5/5):   Flexion:5/5  Extension: 5/5  : 3/5  Intrinsics: 5/5  EPL (extensor pollicis longus): 5/5  Pinch mechanism: 2/5        No results found for this or any previous visit (from the past 24 hour(s)). Assessment and Plan:     Encounter Diagnoses     ICD-10-CM ICD-9-CM   1. Tendonitis M77.9 726.90   2. Left hand pain M79.642 729.5       1. Tendonitis  Patient placed in thumb splint. Advised NSAIDs, ice and rest. F/u in 10 days.   - naproxen (NAPROSYN) 500 mg tablet; Take 1 Tab by mouth two (2) times daily (with meals). Indications: Tendonitis  Dispense: 28 Tab; Refill: 0    2. Left hand pain  I personally reviewed the xray and note no acute fracture. - XR HAND LT MIN 3 V; Future    I have discussed the diagnosis with the patient and the intended plan as seen in the above orders. he has expressed understanding. The patient has received an after-visit summary and questions were answered concerning future plans. I have discussed medication side effects and warnings with the patient as well. Electronically Signed: Charlie Gordon MD     History/Allergies   Patients past medical, surgical and family histories were reviewed and updated.     Past Medical History:   Diagnosis Date    Anemia 4/9/2010    Colon polyp 4/9/2010    DM type 2 (diabetes mellitus, type 2) ([facility] [address] 4/9/2010    HTN (hypertension) 4/9/2010    Hyperlipemia 4/9/2010    Peyronie disease 4/9/2010    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      Past Surgical History:   Procedure Laterality Date    COLONOSCOPY      ENDOSCOPY, COLON, DIAGNOSTIC  2007    Patient instructed to return in 3 Years/over-due    HX HEENT       Family History   Problem Relation Age of Onset    No Known Problems Mother     No Known Problems Father      Social History     Social History    Marital status:      Spouse name: N/A    Number of children: N/A    Years of education: N/A     Occupational History    Not on file. Social History Main Topics    Smoking status: Never Smoker    Smokeless tobacco: Never Used    Alcohol use No    Drug use: No    Sexual activity: Not on file     Other Topics Concern    Not on file     Social History Narrative         No Known Allergies    Disposition     Follow-up Disposition:  Return in about 10 days (around 3/18/2018) for f/u hand pain. No future appointments. Current Medications after this visit     Current Outpatient Prescriptions   Medication Sig    naproxen (NAPROSYN) 500 mg tablet Take 1 Tab by mouth two (2) times daily (with meals). Indications: Tendonitis    memantine (NAMENDA) 10 mg tablet Take 1 Tab by mouth daily.  SITagliptin-metFORMIN (JANUMET) 50-1,000 mg per tablet TAKE 1 TABLET TWICE A DAY WITH MEALS    pravastatin (PRAVACHOL) 20 mg tablet TAKE 1 TABLET DAILY    glipiZIDE SR (GLUCOTROL XL) 10 mg CR tablet TAKE 1 TABLET DAILY     No current facility-administered medications for this visit. There are no discontinued medications.

## 2018-03-08 NOTE — MR AVS SNAPSHOT
[address] [address] 
[phone] Patient: Luís Mallory MRN: [id number] RQV:7/78/[id number] Visit Information Date & Time Provider Department Dept. Phone Encounter #  
 3/8/2018  3:00 PM Veronica Isbell  [facility] [phone] [id number] Follow-up Instructions Return in about 10 days (around 3/18/2018) for f/u hand pain. Upcoming Health Maintenance Date Due Bone Densitometry (Dexa) Screening 8/12/1996 EYE EXAM RETINAL OR DILATED Q1 6/4/2016 FOOT EXAM Q1 3/9/2017 GLAUCOMA SCREENING Q2Y 6/4/2017 Influenza Age 5 to Adult 8/1/2017 LIPID PANEL Q1 6/7/2018 MEDICARE YEARLY EXAM 6/8/2018 HEMOGLOBIN A1C Q6M 8/6/2018 MICROALBUMIN Q1 2/6/2019 DTaP/Tdap/Td series (2 - Td) 7/6/2026 Allergies as of 3/8/2018  Review Complete On: 3/8/2018 By: Veronica Isbell MD  
 No Known Allergies Current Immunizations  Reviewed on 10/1/2010 Name Date Influenza Vaccine (Quad) PF 10/25/2016 Pneumococcal Polysaccharide (PPSV-23) 3/9/2016 TD Vaccine 8/17/1998 Tdap 7/6/2016 ZZZ-RETIRED (DO NOT USE) Pneumococcal Vaccine (Unspecified Type) 8/17/1998 Not reviewed this visit You Were Diagnosed With   
  
 Codes Comments Tendonitis    -  Primary ICD-10-CM: M77.9 ICD-9-CM: 726.90 Left hand pain     ICD-10-CM: V05.693 ICD-9-CM: 729.5 Vitals BP Pulse Temp Resp Height(growth percentile) Weight(growth percentile) 140/70 73 98.2 °F (36.8 °C) (Oral) 18 5' 9\" (1.753 m) 200 lb (90.7 kg) SpO2 BMI Smoking Status 99% 29.53 kg/m2 Never Smoker Vitals History BMI and BSA Data Body Mass Index Body Surface Area  
 29.53 kg/m 2 2.1 m 2 Preferred Pharmacy Pharmacy Name Phone [address] [place], [place] - [address] [place] [address] [phone] Your Updated Medication List  
  
   
 This list is accurate as of 3/8/18  3:29 PM.  Always use your most recent med list.  
  
  
  
  
 glipiZIDE SR 10 mg CR tablet Commonly known as:  GLUCOTROL XL  
TAKE 1 TABLET DAILY  
  
 memantine 10 mg tablet Commonly known as:  Robert Cava Take 1 Tab by mouth daily. naproxen 500 mg tablet Commonly known as:  NAPROSYN Take 1 Tab by mouth two (2) times daily (with meals). Indications: Tendonitis  
  
 pravastatin 20 mg tablet Commonly known as:  PRAVACHOL  
TAKE 1 TABLET DAILY SITagliptin-metFORMIN 50-1,000 mg per tablet Commonly known as:  JANUMET  
TAKE 1 TABLET TWICE A DAY WITH MEALS Prescriptions Sent to Pharmacy Refills  
 naproxen (NAPROSYN) 500 mg tablet 0 Sig: Take 1 Tab by mouth two (2) times daily (with meals). Indications: Tendonitis Class: Normal  
 Pharmacy: [address] [place] [address] #: [phone] Route: Oral  
  
Follow-up Instructions Return in about 10 days (around 3/18/2018) for f/u hand pain. To-Do List   
 03/08/2018 Imaging:  XR HAND LT MIN 3 V Patient Instructions Tendon Injury (Tendinopathy): Care Instructions Your Care Instructions Tendons are tough, flexible tissues that connect muscle to bone. A tendon can hurt or get torn from overuse or aging, especially tendons in the shoulder, elbow, wrist, hip, knee, or ankle. Tendon injuries may be called tendinopathy or tendinitis. Tendon injuries can occur from any motion you have to repeat in a job, sports, or daily activities. Tennis elbow is one common tendon injury. You can treat most tendon problems with over-the-counter pain medicine, rest, changes in your activities, and physical therapy. Follow-up care is a key part of your treatment and safety. Be sure to make and go to all appointments, and call your doctor if you are having problems.  It's also a good idea to know your test results and keep a list of the medicines you take. How can you care for yourself at home? · Rest the sore area. You may have to stop doing the activity that caused the tendon pain for a while. · Take an over-the-counter pain medicine, such as acetaminophen (Tylenol), ibuprofen (Advil, Motrin), or naproxen (Aleve). Read and follow all instructions on the label. · Do not take two or more pain medicines at the same time unless the doctor told you to. Many pain medicines have acetaminophen, which is Tylenol. Too much acetaminophen (Tylenol) can be harmful. · Put ice or a cold pack on the sore area for 10 to 20 minutes at a time. Try to do this every 1 to 2 hours for the next 3 days (when you are awake) or until any swelling goes down. Put a thin cloth between the ice and your skin. · Prop up the sore area on a pillow when you ice it or anytime you sit or lie down during the next 3 days. Try to keep it above the level of your heart. This will help reduce swelling. · Follow your doctor's advice for wearing and caring for a sling, splint, or cast. In some cases, you may wear one of these for a while to help your tendon heal. 
· Follow your doctor's advice for stretching and physical therapy. Gently move your joint through its full range of motion. This will prevent stiffness in your joint. · Go back to your activity slowly. Warm up before and stretch after the activity. You also can try making some changes. For example, if a sport caused your tendon pain, alternate the sport with another activity. If using a tool causes pain, switch hands or change your . Stop the activity if it hurts. After the activity, apply ice to prevent pain and swelling. · Do not smoke. Smoking can slow healing. If you need help quitting, talk to your doctor about stop-smoking programs and medicines. These can increase your chances of quitting for good. When should you call for help?  
Watch closely for changes in your health, and be sure to contact your doctor if: 
? · Your pain gets worse. ? · You do not get better as expected. Where can you learn more? Go to http://sepideh-sol.info/. Enter A157 in the search box to learn more about \"Tendon Injury (Tendinopathy): Care Instructions. \" Current as of: March 21, 2017 Content Version: 11.4 © 1035-3991 Room 77. Care instructions adapted under license by [employer] (which disclaims liability or warranty for this information). If you have questions about a medical condition or this instruction, always ask your healthcare professional. [address] any warranty or liability for your use of this information. Introducing [facility] & HEALTH SERVICES! Eloy Mclean introduces [employer] patient portal. Now you can access parts of your medical record, email your doctor's office, and request medication refills online. 1. In your internet browser, go to [email or web address] 2. Click on the First Time User? Click Here link in the Sign In box. You will see the New Member Sign Up page. 3. Enter your CourseNetworking Access Code exactly as it appears below. You will not need to use this code after youve completed the sign-up process. If you do not sign up before the expiration date, you must request a new code. · CourseNetworking Access Code: [email or web address] Expires: 5/7/2018 10:12 AM 
 
4. Enter the last four digits of your Social Security Number (xxxx) and Date of Birth (mm/dd/yyyy) as indicated and click Submit. You will be taken to the next sign-up page. 5. Create a NoiseToyst ID. This will be your CourseNetworking login ID and cannot be changed, so think of one that is secure and easy to remember. 6. Create a CourseNetworking password. You can change your password at any time. 7. Enter your Password Reset Question and Answer. This can be used at a later time if you forget your password. 8. Enter your e-mail address.  You will receive e-mail notification when new information is available in The Mark News. 9. Click Sign Up. You can now view and download portions of your medical record. 10. Click the Download Summary menu link to download a portable copy of your medical information. If you have questions, please visit the Frequently Asked Questions section of the [employer] Mark News website. Remember, [employer] News is NOT to be used for urgent needs. For medical emergencies, dial 911. Now available from your iPhone and Android! Please provide this summary of care documentation to your next provider. Your primary care clinician is listed as [facility]. If you have any questions after today's visit, please call [phone].

## 2018-03-08 NOTE — PROGRESS NOTES
1. Have you been to the ER, urgent care clinic, or been hospitalized since your last visit? No       2. Have you seen or consulted any other health care providers outside of the [address] [place] [address] since your last visit?   No     Reviewed record in preparation for visit and have necessary documentation  opportunity was given for questions  Goals that were addressed and/or need to be completed during or after this appointment include     Health Maintenance Due   Topic Date Due    Bone Densitometry (Dexa) Screening  08/12/1996    EYE EXAM RETINAL OR DILATED Q1  06/04/2016    FOOT EXAM Q1  03/09/2017    GLAUCOMA SCREENING Q2Y  06/04/2017    Influenza Age 9 to Adult  08/01/2017

## 2018-03-08 NOTE — PATIENT INSTRUCTIONS
Tendon Injury (Tendinopathy): Care Instructions  Your Care Instructions    Tendons are tough, flexible tissues that connect muscle to bone. A tendon can hurt or get torn from overuse or aging, especially tendons in the shoulder, elbow, wrist, hip, knee, or ankle. Tendon injuries may be called tendinopathy or tendinitis. Tendon injuries can occur from any motion you have to repeat in a job, sports, or daily activities. Tennis elbow is one common tendon injury. You can treat most tendon problems with over-the-counter pain medicine, rest, changes in your activities, and physical therapy. Follow-up care is a key part of your treatment and safety. Be sure to make and go to all appointments, and call your doctor if you are having problems. It's also a good idea to know your test results and keep a list of the medicines you take. How can you care for yourself at home? · Rest the sore area. You may have to stop doing the activity that caused the tendon pain for a while. · Take an over-the-counter pain medicine, such as acetaminophen (Tylenol), ibuprofen (Advil, Motrin), or naproxen (Aleve). Read and follow all instructions on the label. · Do not take two or more pain medicines at the same time unless the doctor told you to. Many pain medicines have acetaminophen, which is Tylenol. Too much acetaminophen (Tylenol) can be harmful. · Put ice or a cold pack on the sore area for 10 to 20 minutes at a time. Try to do this every 1 to 2 hours for the next 3 days (when you are awake) or until any swelling goes down. Put a thin cloth between the ice and your skin. · Prop up the sore area on a pillow when you ice it or anytime you sit or lie down during the next 3 days. Try to keep it above the level of your heart. This will help reduce swelling.   · Follow your doctor's advice for wearing and caring for a sling, splint, or cast. In some cases, you may wear one of these for a while to help your tendon heal.  · Follow your doctor's advice for stretching and physical therapy. Gently move your joint through its full range of motion. This will prevent stiffness in your joint. · Go back to your activity slowly. Warm up before and stretch after the activity. You also can try making some changes. For example, if a sport caused your tendon pain, alternate the sport with another activity. If using a tool causes pain, switch hands or change your . Stop the activity if it hurts. After the activity, apply ice to prevent pain and swelling. · Do not smoke. Smoking can slow healing. If you need help quitting, talk to your doctor about stop-smoking programs and medicines. These can increase your chances of quitting for good. When should you call for help? Watch closely for changes in your health, and be sure to contact your doctor if:  ? · Your pain gets worse. ? · You do not get better as expected. Where can you learn more? Go to http://sepideh-sol.info/. Enter A157 in the search box to learn more about \"Tendon Injury (Tendinopathy): Care Instructions. \"  Current as of: March 21, 2017  Content Version: 11.4  © 1824-0142 [employer]. Care instructions adapted under license by [employer] (which disclaims liability or warranty for this information). If you have questions about a medical condition or this instruction, always ask your healthcare professional. [address] any warranty or liability for your use of this information.

## 2018-06-15 PROBLEM — E11.21 TYPE 2 DIABETES WITH NEPHROPATHY (HCC): Status: ACTIVE | Noted: 2018-01-01

## 2018-06-15 NOTE — MR AVS SNAPSHOT
[address] [address] [address] [address] 
[phone] Patient: Sara Tejada MRN: [id number] FMY:[birth year] Visit Information Date & Time Provider Department Dept. Phone Encounter #  
 6/15/2018 11:20 AM Adonis Paiz MD  [facility] [id number] Follow-up Instructions Return in about 4 months (around 10/15/2018). Upcoming Health Maintenance Date Due  
 EYE EXAM RETINAL OR DILATED Q1 6/4/2016 FOOT EXAM Q1 3/9/2017 GLAUCOMA SCREENING Q2Y 6/4/2017 LIPID PANEL Q1 6/7/2018 MEDICARE YEARLY EXAM 6/8/2018 Influenza Age 5 to Adult 8/1/2018 HEMOGLOBIN A1C Q6M 8/6/2018 MICROALBUMIN Q1 2/6/2019 DTaP/Tdap/Td series (2 - Td) 7/6/2026 Allergies as of 6/15/2018  Review Complete On: 6/15/2018 By: Adonis Paiz MD  
 No Known Allergies Current Immunizations  Reviewed on 10/1/2010 Name Date Influenza Vaccine (Quad) PF 10/25/2016 Pneumococcal Polysaccharide (PPSV-23) 3/9/2016 TD Vaccine 8/17/1998 Tdap 7/6/2016 ZZZ-RETIRED (DO NOT USE) Pneumococcal Vaccine (Unspecified Type) 8/17/1998 Not reviewed this visit You Were Diagnosed With   
  
 Codes Comments Type 2 diabetes with nephropathy (HCC)    -  Primary ICD-10-CM: E11.21 
ICD-9-CM: 250.40, 583.81 Essential hypertension     ICD-10-CM: I10 
ICD-9-CM: 401.9 Pure hypercholesterolemia     ICD-10-CM: E78.00 ICD-9-CM: 272.0 Acute pain of left knee     ICD-10-CM: M25.562 ICD-9-CM: 719.46 Medicare annual wellness visit, subsequent     ICD-10-CM: Z00.00 ICD-9-CM: V70.0 Screening for alcoholism     ICD-10-CM: Z13.89 ICD-9-CM: V79.1 Screening for depression     ICD-10-CM: Z13.89 ICD-9-CM: V79.0 Vitals BP Pulse Temp Resp Height(growth percentile) Weight(growth percentile)  139/63 (BP 1 Location: Right arm, BP Patient Position: Sitting) 78 97.6 °F (36.4 °C) (Oral) 20 5' 9\" (1.753 m) 200 lb (90.7 kg) SpO2 BMI Smoking Status 98% 29.53 kg/m2 Never Smoker Vitals History BMI and BSA Data Body Mass Index Body Surface Area  
 29.53 kg/m 2 2.1 m 2 Preferred Pharmacy Pharmacy Name Phone [facility] [place] - [address] [facility] [phone] Your Updated Medication List  
  
   
This list is accurate as of 6/15/18 12:01 PM.  Always use your most recent med list.  
  
  
  
  
 glipiZIDE SR 10 mg CR tablet Commonly known as:  GLUCOTROL XL  
TAKE 1 TABLET DAILY  
  
 naproxen 375 mg tablet Commonly known as:  NAPROSYN Take 1 Tab by mouth two (2) times daily (with meals). As needed for knee pain. pravastatin 20 mg tablet Commonly known as:  PRAVACHOL  
TAKE 1 TABLET DAILY  
  
 * SITagliptin-metFORMIN 50-1,000 mg per tablet Commonly known as:  JANUMET  
TAKE 1 TABLET TWICE A DAY WITH MEALS * JANUMET 50-1,000 mg per tablet Generic drug:  SITagliptin-metFORMIN  
TAKE 1 TABLET TWICE A DAY WITH MEALS * Notice: This list has 2 medication(s) that are the same as other medications prescribed for you. Read the directions carefully, and ask your doctor or other care provider to review them with you. Prescriptions Sent to Pharmacy Refills  
 naproxen (NAPROSYN) 375 mg tablet 0 Sig: Take 1 Tab by mouth two (2) times daily (with meals). As needed for knee pain. Class: Normal  
 Pharmacy: [address] [place] [address] #: [phone] Route: Oral  
  
We Performed the Following [facility]note 3[facility]ho [facility] [[id number] [facility] of hospitals in the [place]] HEMOGLOBIN A1C WITH EAG [00444 CPT(R)] LIPID PANEL [14982 CPT(R)] METABOLIC PANEL, COMPREHENSIVE [28434 CPT(R)] IA ANNUAL ALCOHOL SCREEN 15 MIN [id number] Lists of hospitals in the [place]] REFERRAL TO OPHTHALMOLOGY [REF57 Custom] Comments:  
 Patient with T2D and HTN in need of eye exam. 
Dr. Linda Schmidt [facility] 3RD GENERATION [07714 CPT(R)] Follow-up Instructions Return in about 4 months (around 10/15/2018). Referral Information Referral ID Referred By Referred To  
  
 [id number] Juan ROMAN Not Available Visits Status Start Date End Date 1 New Request 6/15/18 6/15/19 If your referral has a status of pending review or denied, additional information will be sent to support the outcome of this decision. Patient Instructions Medicare Wellness Visit, Male The best way to live healthy is to have a lifestyle where you eat a well-balanced diet, exercise regularly, limit alcohol use, and quit all forms of tobacco/nicotine, if applicable. Regular preventive services are another way to keep healthy. Preventive services (vaccines, screening tests, monitoring & exams) can help personalize your care plan, which helps you manage your own care. Screening tests can find health problems at the earliest stages, when they are easiest to treat. [facility] follows the current, evidence-based guidelines published by the [facility] States Willie Mitali ([facility]) when recommending preventive services for our patients. Because we follow these guidelines, sometimes recommendations change over time as research supports it. (For example, a prostate screening blood test is no longer routinely recommended for men with no symptoms.) Of course, you and your provider may decide to screen more often for some diseases, based on your risk and co-morbidities (chronic disease you are already diagnosed with). Preventive services for you include: - Medicare offers their members a free annual wellness visit, which is time for you and your primary care provider to discuss and plan for your preventive service needs. Take advantage of this benefit every year! 
 
-All people over age 72 should receive the recommended pneumonia vaccines. Current USPSTF guidelines recommend a series of two vaccines for the best pneumonia protection.  
 
-All adults should have a yearly flu vaccine and a tetanus vaccine every 10 years. All adults age 61 years should receive a shingles vaccine once in their lifetime.   
 
-All adults age 38-68 years who are overweight should have a diabetes screening test once every three years.  
 
-Other screening tests & preventive services for persons with diabetes include: an eye exam to screen for diabetic retinopathy, a kidney function test, a foot exam, and stricter control over your cholesterol.  
 
-Cardiovascular screening for adults with routine risk involves an electrocardiogram (ECG) at intervals determined by the provider.  
 
-Colorectal cancer screenings should be done for adults age 54-65 years with normal risk. There are a number of acceptable methods of screening for this type of cancer. Each test has its own benefits and drawbacks. Discuss with your provider what is most appropriate for you during your annual wellness visit. The different tests include: colonoscopy (considered the best screening method), a fecal occult blood test, a fecal DNA test, and sigmoidoscopy. 
 
-All adults born between [facility] should be screened once for Hepatitis C. 
 
-An Abdominal Aortic Aneurysm (AAA) Screening is recommended for men age 73-68 who has ever smoked in their lifetime. Here is a list of your current Health Maintenance items (your personalized list of preventive services) with a due date: 
Health Maintenance Due Topic Date Due [facility] Eye Exam  06/04/2016 Sal [facility] Diabetic Foot Care  03/09/2017  Glaucoma Screening   06/04/2017  Cholesterol Test   06/07/2018 [facility] Annual Well Visit  06/08/2018 Introducing [facility] & HEALTH SERVICES! Johnson Echevarria introduces [employer] patient portal. Now you can access parts of your medical record, email your doctor's office, and request medication refills online. 1. In your internet browser, go to [email or web address] 2. Click on the First Time User? Click Here link in the Sign In box. You will see the New Member Sign Up page. 3. Enter your ID AMERICA Access Code exactly as it appears below. You will not need to use this code after youve completed the sign-up process. If you do not sign up before the expiration date, you must request a new code. · ID AMERICA Access Code: [email or web address] Expires: 9/13/2018 11:54 AM 
 
4. Enter the last four digits of your Social Security Number (xxxx) and Date of Birth (mm/dd/yyyy) as indicated and click Submit. You will be taken to the next sign-up page. 5. Create a NotesFirstt ID. This will be your ID AMERICA login ID and cannot be changed, so think of one that is secure and easy to remember. 6. Create a ID AMERICA password. You can change your password at any time. 7. Enter your Password Reset Question and Answer. This can be used at a later time if you forget your password. 8. Enter your e-mail address. You will receive e-mail notification when new information is available in [address] 9. Click Sign Up. You can now view and download portions of your medical record. 10. Click the Download Summary menu link to download a portable copy of your medical information. If you have questions, please visit the Frequently Asked Questions section of the ID AMERICA website. Remember, ID AMERICA is NOT to be used for urgent needs. For medical emergencies, dial 911. Now available from your iPhone and Android! Please provide this summary of care documentation to your next provider. Your primary care clinician is listed as O[facility]. If you have any questions after today's visit, please call [phone].

## 2018-06-15 NOTE — PROGRESS NOTES
Patient: Mirza Pinto MRN: [id number]  SSN: [id number]    YOB: 1931  Age: 80 y.o. Sex: male        Subjective:     Chief Complaint   Patient presents with    Annual Wellness Visit    Knee Pain     Left Knee x1 wk       HPI: he is a 80y.o. year old male who presents for follow up of chronic medical conditions. Patient with hx of DM2, HTN, HLD, dementia and anemia. He complains of left knee pain. Diabetes: This patient is being treating under a comprehensive plan of care for diabetes. Overall the patient feels well with good energy level. Insulin dependence: no   Pertinent Labs:     Lab Results   Component Value Date/Time    Hemoglobin A1c 7.6 (H) 06/15/2018 01:04 PM      Body mass index is 29.53 kg/(m^2). Lab Results   Component Value Date/Time    LDL, calculated 90 06/15/2018 01:04 PM        Dietary compliance: Good   Medication compliance:Good   On ASA: Yes        Wt Readings from Last 3 Encounters:   06/15/18 200 lb (90.7 kg)   03/08/18 200 lb (90.7 kg)   02/20/18 193 lb 9.6 oz (87.8 kg)        History   Smoking Status    Never Smoker   Smokeless Tobacco    Never Used        Medications, diet and exercise as means of diabetic control with a goal of an A1C of less than 7.0% discussed. Diabetic foot care and annual eye exam discussed as well. Check blood sugars while fasting just before breakfast on most days and occasionally before dinner. Write down readings in a diabetic log book and bring them to the next visit. Call the office for fasting sugars over 200 or below 75 on two or more occasions. Call immediately if having symptoms of high sugar (frequent urination, always thirsty) or low sugar (dizzy, lethargic, sweaty, nauseated, headache). Our overall goal is to reduce or eliminate the long term consequences of poorly controlled diabetes. Patient expresses understanding and agreement with our plan of care.     Hypertension:  The patient reports:  taking medications as instructed, no medication side effects noted, no TIA's, no chest pain on exertion, no dyspnea on exertion, no swelling of ankles. Lifestyle modification/social history: sedentary     BP Readings from Last 3 Encounters:   06/15/18 139/63   03/08/18 140/70   02/20/18 109/58     Patient advised to log blood pressures at home 2-3 times weekly and bring to next visit. Call office as soon as possible if BP's over 140/90 on multiple occasions or with symptoms of dizziness, chest pain, shortness of breath, headache or ankle swelling. Our goal is to normalize the blood pressure to decrease the risks of strokes and heart attacks. The patient is in agreement with the plan. Current and past medical information:    Current Medications after this visit[de-identified]     Current Outpatient Prescriptions   Medication Sig    naproxen (NAPROSYN) 375 mg tablet Take 1 Tab by mouth two (2) times daily (with meals). As needed for knee pain.  pravastatin (PRAVACHOL) 20 mg tablet TAKE 1 TABLET DAILY    glipiZIDE SR (GLUCOTROL XL) 10 mg CR tablet TAKE 1 TABLET DAILY    SITagliptin-metFORMIN (JANUMET) 50-1,000 mg per tablet TAKE 1 TABLET TWICE A DAY WITH MEALS    JANUMET 50-1,000 mg per tablet TAKE 1 TABLET TWICE A DAY WITH MEALS     No current facility-administered medications for this visit.         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    Colon polyp 04/09/2010       Past Medical History:   Diagnosis Date    Anemia 4/9/2010    Colon polyp 4/9/2010    DM type 2 (diabetes mellitus, type 2) ([facility] 4/9/2010    HTN (hypertension) 4/9/2010    Hyperlipemia 4/9/2010    Peyronie disease 4/9/2010    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       No Known Allergies    Past Surgical History:   Procedure Laterality Date    COLONOSCOPY      ENDOSCOPY, COLON, DIAGNOSTIC  2007    Patient instructed to return in 3 Years/over-due    HX HEENT         Social History     Social History    Marital status:      Spouse name: N/A    Number of children: N/A    Years of education: N/A     Social History Main Topics    Smoking status: Never Smoker    Smokeless tobacco: Never Used    Alcohol use No    Drug use: No    Sexual activity: Not Asked     Other Topics Concern    None     Social History Narrative         Objective:     Review of Systems:  Constitutional: Negative for fatigue or malaise  Derm: Negative for rash or lesion  HEENT: Negative for acute hearing or vision changes  Cardiovascular: Negative for dizziness, chest pain or palpitations  Respiratory: Negative for cough, wheezing or SOB  Gastreintestinal: Negative for nausea or abdominal pain  Genital/urinary: Negative for dysuria or voiding dysfunction  Muscoloskeletal: Negative for acute myalgias or arthralgias   Neurological: Negative for headache, weakness or paresthesia  Psychological: Negative for depression or anxiety      Vitals:    06/15/18 1131   BP: 139/63   Pulse: 78   Resp: 20   Temp: 97.6 °F (36.4 °C)   TempSrc: Oral   SpO2: 98%   Weight: 200 lb (90.7 kg)   Height: 5' 9\" (1.753 m)      Body mass index is 29.53 kg/(m^2).     Physical Exam:  Constitutional: well developed, well nourished, in no acute distress  Skin: warm and dry, normal tone and turgor  Head: normocephalic, atraumatic  Eyes: sclera clear, EOMI, PERRL  Neck: normal range of motion  Cardiovascular: normal S1, S2, regular rate and rhythm  Respiratory: clear to auscultation bilaterally with symmetrical effort  Abdomen: soft, BS normal  Extremities: left knee with full range of motion, medial TTP  Feet: diminished vibratory sensation, 1+ pedal pulses, no lesion  Neurology: normal strength and sensation  Psych: active, alert and oriented, affect appropriate Health Maintenance Due   Topic Date Due    EYE EXAM RETINAL OR DILATED Q1  06/04/2016    FOOT EXAM Q1  03/09/2017    GLAUCOMA SCREENING Q2Y  06/04/2017    MEDICARE YEARLY EXAM  06/08/2018       Risk, benefits and potential costs of recommended health maintenance discussed. Patient expressed understanding and declined at this time. Assessment and orders:     Diagnoses and all orders for this visit:    1. Type 2 diabetes with nephropathy (HCC)  -     METABOLIC PANEL, COMPREHENSIVE  -     TSH 3RD GENERATION  -     HEMOGLOBIN A1C WITH EAG  -     LIPID PANEL  -     REFERRAL TO OPHTHALMOLOGY    2. Essential hypertension  -     METABOLIC PANEL, COMPREHENSIVE  -     TSH 3RD GENERATION  -     REFERRAL TO OPHTHALMOLOGY    3. Pure hypercholesterolemia  -     METABOLIC PANEL, COMPREHENSIVE  -     LIPID PANEL  -     REFERRAL TO OPHTHALMOLOGY    4. Acute pain of left knee  -     naproxen (NAPROSYN) 375 mg tablet; Take 1 Tab by mouth two (2) times daily (with meals). As needed for knee pain. Plan of care:  Diagnoses were discussed in detail with patient. Medication risks/benefits/side effects discussed with patient. All of the patient's questions were addressed and answered to apparent satisfaction. The patient understands and agrees with our plan of care. The patient knows to call back if they have questions about the plan of care or if symptoms change. The patient received an After-Visit Summary which contains VS, diagnoses, orders, allergy and medication lists. Patient Care Team:  Karrie Guevara MD as PCP - General (Family Practice)  Ortiz Anand MD (Internal Medicine)  José Miguel Hampton RN as Gavin Lopez MD (Ophthalmology)    Follow-up Disposition:  Return in about 4 months (around 10/15/2018), or if symptoms worsen or fail to improve.     Future Appointments  Date Time Provider Otoniel Kaplan   10/15/2018 9:00 AM Karrie Guevara MD [facility] Signed By: Devonte White MD     June 28, 2018        The following Annual Medicare Wellness Exam is distinct and separate from the medical evaluation and decision making. This is the Subsequent Medicare Annual Wellness Exam, performed 12 months or more after the Initial AWV or the last Subsequent AWV    I have reviewed the patient's medical history in detail and updated the computerized patient record. 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 As needed for knee pain.  60 Tab 0    pravastatin (PRAVACHOL) 20 mg tablet TAKE 1 TABLET DAILY 90 Tab 1    glipiZIDE SR (GLUCOTROL XL) 10 mg CR tablet TAKE 1 TABLET DAILY 90 Tab 1    SITagliptin-metFORMIN (JANUMET) 50-1,000 mg per tablet TAKE 1 TABLET TWICE A DAY WITH MEALS 180 Tab 1    JANUMET 50-1,000 mg per tablet TAKE 1 TABLET TWICE A DAY WITH MEALS 180 Tab 0     No Known Allergies  Family History   Problem Relation Age of Onset    No Known Problems Mother     No Known Problems Father      Social History   Substance Use Topics    Smoking status: Never Smoker    Smokeless tobacco: Never Used    Alcohol use No     Patient Active Problem List   Diagnosis Code    DM type 2 (diabetes mellitus, type 2) (HCC) E11.9    HTN (hypertension) I10    Hyperlipemia E78.5    Peyronie disease N48.6    Anemia D64.9    PPD positive R76.11    Colon polyp K63.5    Type 2 diabetes with nephropathy ([facility] E11.21       Depression Risk Factor Screening:     PHQ over the last two weeks 10/25/2016   Little interest or pleasure in doing things Not at all   Feeling down, depressed or hopeless Not at all   Total Score PHQ 2 0     Alcohol Risk Factor Screening: You do not drink alcohol or very rarely. Functional Ability and Level of Safety:   Hearing Loss  Hearing is good. Activities of Daily Living  The home contains: no safety equipment. Patient does total self care    Fall Risk  Fall Risk Assessment, last 12 mths 10/25/2016   Able to walk? Yes   Fall in past 12 months? No   Fall with injury? -   Number of falls in past 12 months -   Fall Risk Score -       Abuse Screen  Patient is not abused    Cognitive Screening   Evaluation of Cognitive Function:  Has your family/caregiver stated any concerns about your memory: yes  Has dx of dementia. SLUMS - 13    Patient Care Team   Patient Care Team:  Annie Armstrong MD as PCP - General (Family Practice)  Tresa Doss MD (Internal Medicine)  Jeimy Hull RN as Gavin Lopez MD (Ophthalmology)    Assessment/Plan   Education and counseling provided:  Are appropriate based on today's review and evaluation  End-of-Life planning (with patient's consent)    Diagnoses and all orders for this visit:    1. Medicare annual wellness visit, subsequent    2. Screening for alcoholism  -     Annual  Alcohol Screen 15 min ()    3.  Screening for depression  -     Depression Screen Annual        Health Maintenance Due   Topic Date Due    EYE EXAM RETINAL OR DILATED Q1  06/04/2016    FOOT EXAM Q1  03/09/2017    GLAUCOMA SCREENING Q2Y  06/04/2017    MEDICARE YEARLY EXAM  06/08/2018

## 2018-06-15 NOTE — PATIENT INSTRUCTIONS
Medicare Wellness Visit, Male    The best way to live healthy is to have a lifestyle where you eat a well-balanced diet, exercise regularly, limit alcohol use, and quit all forms of tobacco/nicotine, if applicable. Regular preventive services are another way to keep healthy. Preventive services (vaccines, screening tests, monitoring & exams) can help personalize your care plan, which helps you manage your own care. Screening tests can find health problems at the earliest stages, when they are easiest to treat. [facility] follows the current, evidence-based guidelines published by the [facility] ([facility]) when recommending preventive services for our patients. Because we follow these guidelines, sometimes recommendations change over time as research supports it. (For example, a prostate screening blood test is no longer routinely recommended for men with no symptoms.)    Of course, you and your provider may decide to screen more often for some diseases, based on your risk and co-morbidities (chronic disease you are already diagnosed with). Preventive services for you include:    - Medicare offers their members a free annual wellness visit, which is time for you and your primary care provider to discuss and plan for your preventive service needs. Take advantage of this benefit every year!    -All people over age 72 should receive the recommended pneumonia vaccines. Current USPSTF guidelines recommend a series of two vaccines for the best pneumonia protection.     -All adults should have a yearly flu vaccine and a tetanus vaccine every 10 years.  All adults age 61 years should receive a shingles vaccine once in their lifetime.      -All adults age 38-68 years who are overweight should have a diabetes screening test once every three years.     -Other screening tests & preventive services for persons with diabetes include: an eye exam to screen for diabetic retinopathy, a kidney function test, a foot exam, and stricter control over your cholesterol.     -Cardiovascular screening for adults with routine risk involves an electrocardiogram (ECG) at intervals determined by the provider.     -Colorectal cancer screenings should be done for adults age 54-65 years with normal risk. There are a number of acceptable methods of screening for this type of cancer. Each test has its own benefits and drawbacks. Discuss with your provider what is most appropriate for you during your annual wellness visit. The different tests include: colonoscopy (considered the best screening method), a fecal occult blood test, a fecal DNA test, and sigmoidoscopy.    -All adults born between [facility] should be screened once for Hepatitis C.    -An Abdominal Aortic Aneurysm (AAA) Screening is recommended for men age 73-68 who has ever smoked in their lifetime.      Here is a list of your current Health Maintenance items (your personalized list of preventive services) with a due date:  Health Maintenance Due   Topic Date Due    Eye Exam  06/04/2016    Diabetic Foot Care  03/09/2017    Glaucoma Screening   06/04/2017    Cholesterol Test   06/07/2018    Annual Well Visit  06/08/2018

## 2018-06-15 NOTE — PROGRESS NOTES
1. Have you been to the ER, urgent care clinic, or been hospitalized since your last visit? No     2. Have you seen or consulted any other health care providers outside of the [address] [place] [address] since your last visit? Include any pap smears or colon screening.     Reviewed record in preparation for visit and have necessary documentation  Pt did not bring medication to office visit for review  Information was given to pt on Advanced Directives, Living Will  Information was given on Shingles Vaccine  opportunity was given for questions  Goals that were addressed and/or need to be completed during or after this appointment include       Health Maintenance Due   Topic Date Due    EYE EXAM RETINAL OR DILATED Q1  06/04/2016    FOOT EXAM Q1  03/09/2017    GLAUCOMA SCREENING Q2Y  06/04/2017    LIPID PANEL Q1  06/07/2018    MEDICARE YEARLY EXAM  06/08/2018

## 2018-06-21 NOTE — TELEPHONE ENCOUNTER
Dr. Joselito Gutierrez is out of the office today and tomorrow. Per Steve, I am sending any urgent Rx refill requests to [facility].

## 2018-08-09 NOTE — TELEPHONE ENCOUNTER
Pt daughter called wanting to know did you receive the POA forms. They were faxed last night.     [phone]

## 2018-11-11 NOTE — LETTER
11/12/2018 8:54 AM 
 
Mr. Henry Klein [id number] [facility] [address] Dear Mr. Gavin Sandifershiela Saud missed you! Please call our office at [phone] and schedule a follow up appointment for your continued care. Please call to schedule appointment before we can refill any medications. Thank you! Sincerely, Eliazar Groves MD

## 2019-01-01 ENCOUNTER — OFFICE VISIT (OUTPATIENT)
Dept: FAMILY MEDICINE CLINIC | Age: 84
End: 2019-01-01

## 2019-01-01 ENCOUNTER — TELEPHONE (OUTPATIENT)
Dept: FAMILY MEDICINE CLINIC | Age: 84
End: 2019-01-01

## 2019-01-01 VITALS
HEART RATE: 60 BPM | DIASTOLIC BLOOD PRESSURE: 61 MMHG | WEIGHT: 171 LBS | OXYGEN SATURATION: 99 % | SYSTOLIC BLOOD PRESSURE: 138 MMHG | BODY MASS INDEX: 25.33 KG/M2 | HEIGHT: 69 IN | TEMPERATURE: 97.4 F | RESPIRATION RATE: 16 BRPM

## 2019-01-01 VITALS
RESPIRATION RATE: 18 BRPM | TEMPERATURE: 97.8 F | WEIGHT: 193 LBS | DIASTOLIC BLOOD PRESSURE: 77 MMHG | OXYGEN SATURATION: 98 % | HEART RATE: 63 BPM | HEIGHT: 69 IN | BODY MASS INDEX: 28.58 KG/M2 | SYSTOLIC BLOOD PRESSURE: 154 MMHG

## 2019-01-01 DIAGNOSIS — F03.90 DEMENTIA WITHOUT BEHAVIORAL DISTURBANCE, UNSPECIFIED DEMENTIA TYPE: ICD-10-CM

## 2019-01-01 DIAGNOSIS — I10 ESSENTIAL HYPERTENSION: ICD-10-CM

## 2019-01-01 DIAGNOSIS — H91.93 BILATERAL HEARING LOSS, UNSPECIFIED HEARING LOSS TYPE: ICD-10-CM

## 2019-01-01 DIAGNOSIS — E11.65 UNCONTROLLED TYPE 2 DIABETES MELLITUS WITH HYPERGLYCEMIA (HCC): Primary | ICD-10-CM

## 2019-01-01 DIAGNOSIS — E78.2 MIXED HYPERLIPIDEMIA: ICD-10-CM

## 2019-01-01 DIAGNOSIS — E11.21 TYPE 2 DIABETES WITH NEPHROPATHY (HCC): Primary | ICD-10-CM

## 2019-01-01 LAB
ALBUMIN SERPL-MCNC: 4.1 G/DL (ref 3.5–4.7)
ALBUMIN UR QL STRIP: 30 MG/L
ALBUMIN/GLOB SERPL: 1.5 {RATIO} (ref 1.2–2.2)
ALP SERPL-CCNC: 81 IU/L (ref 39–117)
ALT SERPL-CCNC: 15 IU/L (ref 0–44)
AST SERPL-CCNC: 12 IU/L (ref 0–40)
BILIRUB SERPL-MCNC: 0.4 MG/DL (ref 0–1.2)
BUN SERPL-MCNC: 9 MG/DL (ref 8–27)
BUN/CREAT SERPL: 9 (ref 10–24)
CALCIUM SERPL-MCNC: 8.8 MG/DL (ref 8.6–10.2)
CHLORIDE SERPL-SCNC: 101 MMOL/L (ref 96–106)
CHOLEST SERPL-MCNC: 142 MG/DL (ref 100–199)
CO2 SERPL-SCNC: 25 MMOL/L (ref 20–29)
CREAT SERPL-MCNC: 0.95 MG/DL (ref 0.76–1.27)
CREATININE, URINE POC: 200 MG/DL
GLOBULIN SER CALC-MCNC: 2.7 G/DL (ref 1.5–4.5)
GLUCOSE SERPL-MCNC: 192 MG/DL (ref 65–99)
HBA1C MFR BLD HPLC: 10.2 %
HBA1C MFR BLD HPLC: 8.2 %
HCT VFR BLD AUTO: 37.9 % (ref 37.5–51)
HDLC SERPL-MCNC: 47 MG/DL
HGB BLD-MCNC: 12 G/DL (ref 13–17.7)
LDLC SERPL CALC-MCNC: 84 MG/DL (ref 0–99)
MICROALBUMIN/CREAT RATIO POC: <30 MG/G
POTASSIUM SERPL-SCNC: 4.4 MMOL/L (ref 3.5–5.2)
PROT SERPL-MCNC: 6.8 G/DL (ref 6–8.5)
SODIUM SERPL-SCNC: 140 MMOL/L (ref 134–144)
TRIGL SERPL-MCNC: 54 MG/DL (ref 0–149)
TSH SERPL DL<=0.005 MIU/L-ACNC: 1.39 UIU/ML (ref 0.45–4.5)
VLDLC SERPL CALC-MCNC: 11 MG/DL (ref 5–40)

## 2019-01-01 RX ORDER — MEMANTINE HYDROCHLORIDE 10 MG/1
10 TABLET ORAL DAILY
Qty: 90 TAB | Refills: 1 | Status: SHIPPED | OUTPATIENT
Start: 2019-01-01

## 2019-01-01 RX ORDER — GLIPIZIDE 10 MG/1
TABLET, FILM COATED, EXTENDED RELEASE ORAL
Qty: 90 TAB | Refills: 0 | Status: SHIPPED | OUTPATIENT
Start: 2019-01-01

## 2019-01-01 RX ORDER — PRAVASTATIN SODIUM 20 MG/1
20 TABLET ORAL
Qty: 90 TAB | Refills: 0 | Status: SHIPPED | OUTPATIENT
Start: 2019-01-01

## 2019-03-18 NOTE — PATIENT INSTRUCTIONS

## 2019-03-18 NOTE — PROGRESS NOTES
1. Have you been to the ER, urgent care clinic since your last visit? Hospitalized since your last visit? No    2. Have you seen or consulted any other health care providers outside of the [address] [place] [address] since your last visit? Include any pap smears or colon screening.  No  Reviewed record in preparation for visit and have necessary documentation  Pt did not bring medication to office visit for review    Goals that were addressed and/or need to be completed during or after this appointment include   Health Maintenance Due   Topic Date Due    Shingrix Vaccine Age 50> (1 of 2) 08/12/1981    Influenza Age 5 to Adult  08/01/2018    HEMOGLOBIN A1C Q6M  12/15/2018    MICROALBUMIN Q1  02/06/2019

## 2019-03-24 NOTE — PROGRESS NOTES
Progress Note Patient: Do Tanner MRN: [id number]  SSN: [id number] YOB: 1931  Age: 80 y.o. Sex: male Chief Complaint Patient presents with [facility] Annual Wellness Visit  Diabetes  
 
he is a 80y.o. year old male who presents for follow up of chronic medical conditions. Last OV 8 months ago. Patient with dx of dementia with worsening problems with memory. Patient presents alone. Cannot recall his medications and appears to have been out of these for weeks to months. Will refill these and restart memantine 10 mg daily. Patient denies HA, dizziness, SOB, CP, abdominal pain, dysuria, myalgias or arthralgias. SLUMS score: 7 Diabetes: This patient is being treating under a comprehensive plan of care for diabetes. Overall the patient feels well with good energy level. Insulin dependence: no 
 Pertinent Labs:  
Lab Results Component Value Date/Time Hemoglobin A1c 7.6 (H) 06/15/2018 01:04 PM  
 
 Body mass index is 25.25 kg/m². Lab Results Component Value Date/Time LDL, calculated 84 03/18/2019 02:17 PM  
 
 
  
Wt Readings from Last 3 Encounters:  
03/18/19 171 lb (77.6 kg) 06/15/18 200 lb (90.7 kg) 03/08/18 200 lb (90.7 kg) Social History Tobacco Use Smoking Status Never Smoker Smokeless Tobacco Never Used Medications, diet and exercise as means of diabetic control with a goal of an A1C of less than 7.0% discussed. Diabetic foot care and annual eye exam discussed as well. Check blood sugars while fasting just before breakfast on most days and occasionally before dinner. Write down readings in a diabetic log book and bring them to the next visit. Call the office for fasting sugars over 200 or below 75 on two or more occasions. Call immediately if having symptoms of high sugar (frequent urination, always thirsty) or low sugar (dizzy, lethargic, sweaty, nauseated, headache). Our overall goal is to reduce or eliminate the long term consequences of poorly controlled diabetes. Patient expresses understanding and agreement with our plan of care. Hypertension: The patient reports:  not taking medications regularly as instructed, no TIA's, no chest pain on exertion, no dyspnea on exertion, no swelling of ankles. BP Readings from Last 3 Encounters:  
03/18/19 138/61  
06/15/18 139/63  
03/08/18 140/70 Lab Results Component Value Date/Time Sodium 140 03/18/2019 02:17 PM  
 Potassium 4.4 03/18/2019 02:17 PM  
 Chloride 101 03/18/2019 02:17 PM  
 CO2 25 03/18/2019 02:17 PM  
 Anion gap 7 11/02/2010 12:20 PM  
 Glucose 192 (H) 03/18/2019 02:17 PM  
 BUN 9 03/18/2019 02:17 PM  
 Creatinine 0.95 03/18/2019 02:17 PM  
 BUN/Creatinine ratio 9 (L) 03/18/2019 02:17 PM  
 GFR est AA 83 03/18/2019 02:17 PM  
 GFR est non-AA 72 03/18/2019 02:17 PM  
 Calcium 8.8 03/18/2019 02:17 PM  
 
Patient advised to log blood pressures at home 2-3 times weekly and bring to next visit. Call office as soon as possible if BP's over 140/90 on multiple occasions or with symptoms of dizziness, chest pain, shortness of breath, headache or ankle swelling. Our goal is to normalize the blood pressure to decrease the risks of strokes and heart attacks. The patient is in agreement with the plan. Encounter Diagnoses Name Primary?  Uncontrolled type 2 diabetes mellitus with hyperglycemia ([address] Yes  Mixed hyperlipidemia  Essential hypertension  Dementia without behavioral disturbance, unspecified dementia type Patient Active Problem List  
Diagnosis Code  DM type 2 (diabetes mellitus, type 2) (HCC) E11.9  
 HTN (hypertension) I10  
 Hyperlipemia E78.5  Peyronie disease N48.6  Anemia D64.9  PPD positive R76.11  
 Colon polyp K63.5  Type 2 diabetes with nephropathy (HCC) E11.21 Past Surgical History:  
Procedure Laterality Date  COLONOSCOPY  ENDOSCOPY, COLON, DIAGNOSTIC  2007 Patient instructed to return in 3 Years/over-due  HX HEENT Social History Socioeconomic History  Marital status:  Spouse name: Not on file  Number of children: Not on file  Years of education: Not on file  Highest education level: Not on file Occupational History  Not on file Social Needs  Financial resource strain: Not on file  Food insecurity:  
  Worry: Not on file Inability: Not on file  Transportation needs:  
  Medical: Not on file Non-medical: Not on file Tobacco Use  Smoking status: Never Smoker  Smokeless tobacco: Never Used Substance and Sexual Activity  Alcohol use: No  
 Drug use: No  
 Sexual activity: Not on file Lifestyle  Physical activity:  
  Days per week: Not on file Minutes per session: Not on file  Stress: Not on file Relationships  Social connections:  
  Talks on phone: Not on file Gets together: Not on file Attends Buddhist service: Not on file Active member of club or organization: Not on file Attends meetings of clubs or organizations: Not on file Relationship status: Not on file  Intimate partner violence:  
  Fear of current or ex partner: Not on file Emotionally abused: Not on file Physically abused: Not on file Forced sexual activity: Not on file Other Topics Concern  Not on file Social History Narrative  Not on file Family History Problem Relation Age of Onset  No Known Problems Mother  No Known Problems Father Current Outpatient Medications Medication Sig  
 memantine (NAMENDA) 10 mg tablet Take 1 Tab by mouth daily.  glipiZIDE SR (GLUCOTROL XL) 10 mg CR tablet TAKE 1 TABLET DAILY  pravastatin (PRAVACHOL) 20 mg tablet Take 1 Tab by mouth nightly.   
 SITagliptin-metFORMIN (JANUMET) 50-1,000 mg per tablet TAKE 1 TABLET TWICE A DAY WITH MEALS  
  naproxen (NAPROSYN) 375 mg tablet Take 1 Tab by mouth two (2) times daily (with meals). As needed for knee pain. No current facility-administered medications for this visit. No Known Allergies Review of Systems: 
Constitutional: Negative for fatigue, malaise Resp: Negative for cough, wheezing or SOB 
CV: Negative for chest pain, dizziness or palpitations GI: Negative for nausea or abdominal pain MS: Negative for acute myalgias or arthralgias Neuro: Negative for HA, weakness or paresthesia Psych: Negative for depression or anxiety Vitals:  
 03/18/19 1006 BP: 138/61 Pulse: 60 Resp: 16 Temp: 97.4 °F (36.3 °C) TempSrc: Oral  
SpO2: 99% Weight: 171 lb (77.6 kg) Height: 5' 9\" (1.753 m) Physical Examination: 
General: Well developed, well nourished, in no acute distress Head: Normocephalic, atraumatic Eyes: Sclera clear, EOMI Neck: Normal range of motion Respiratory: symmetrical, unlabored effort Cardiovascular: Regular rate and rhythm Extremities: Full range of motion, normal gait Neurologic: No focal deficits Psych: Active, alert. Affect appropriate ICD-10-CM ICD-9-CM 1. Uncontrolled type 2 diabetes mellitus with hyperglycemia (HCC) E11.65 250.02 AMB POC URINE, MICROALBUMIN, SEMIQUANT (3 RESULTS) AMB POC HEMOGLOBIN A1C  
   glipiZIDE SR (GLUCOTROL XL) 10 mg CR tablet  
   pravastatin (PRAVACHOL) 20 mg tablet SITagliptin-metFORMIN (JANUMET) 50-1,000 mg per tablet LIPID PANEL  
   METABOLIC PANEL, COMPREHENSIVE  
   [facility] 3RD GENERATION  
   HGB & HCT  
   REFERRAL TO HOME HEALTH 2. Mixed hyperlipidemia E78.2 272.2 pravastatin (PRAVACHOL) 20 mg tablet LIPID PANEL  
   METABOLIC PANEL, COMPREHENSIVE [address] [place] [address] 3. Essential hypertension F34 831.0 METABOLIC PANEL, COMPREHENSIVE  
   [facility] 3RD GENERATION  
   REFERRAL TO HOME HEALTH 4.  Dementia without behavioral disturbance, unspecified dementia type F03.90 294.20 memantine (NAMENDA) 10 mg tablet [address] [place] [address] I have discussed the diagnosis with the patient  and the intended plan as seen in the above orders. The patient expresses understanding and agreement with our plan of care. All of the patient's questions were answered to apparent satisfaction. The patient has received an after-visit summary. The patient knows to call our office if there are any questions or concerns regarding diagnosis and treatment plans. I have discussed medication side effects and warnings with the patient as well. Over half of the 25 minutes face to face with Abelardo Meng consisted of counseling and discussing treatment plans in reference to his memory loss and home safety. Follow-up and Dispositions · Return in about 6 weeks (around 4/29/2019), or if symptoms worsen or fail to improve.

## 2019-04-29 NOTE — PROGRESS NOTES
1. Have you been to the ER, urgent care clinic since your last visit? Hospitalized since your last visit? No 
 
2. Have you seen or consulted any other health care providers outside of the [address] [place] [address] since your last visit? Include any pap smears or colon screening. No 
Reviewed record in preparation for visit and have necessary documentation Pt did not bring medication to office visit for review 
opportunity was given for questions Goals that were addressed and/or need to be completed during or after this appointment include  There are no preventive care reminders to display for this patient.

## 2019-04-29 NOTE — PROGRESS NOTES
Progress Note Patient: Lexi Jerez MRN: [id number]  SSN: [id number] YOB: 1931  Age: 80 y.o. Sex: male Chief Complaint Patient presents with  Diabetes  Hypertension  
 
he is a 80y.o. year old male who presents for follow up of chronic medical conditions. Patient presents with granddaughter. Patient with hx of T2D, HTN and HLD. Patient with dx of dementia with worsening problems with memory . HH has been coming to home regularly. They should be helping with his medications. Patient denies HA, dizziness, SOB, CP, abdominal pain, dysuria, acute myalgias or arthralgias. He complains of hearing loss. Diabetes: This patient is being treating under a comprehensive plan of care for diabetes. Overall the patient feels well with good energy level. Insulin dependence: no 
 Pertinent Labs:  
Lab Results Component Value Date/Time Hemoglobin A1c 7.6 (H) 06/15/2018 01:04 PM  
 Hemoglobin A1c (POC) 8.2 04/29/2019 10:04 AM  
  
 Body mass index is 28.5 kg/m². Lab Results Component Value Date/Time LDL, calculated 84 03/18/2019 02:17 PM  
 
 
  
Wt Readings from Last 3 Encounters:  
04/29/19 193 lb (87.5 kg) 03/18/19 171 lb (77.6 kg) 06/15/18 200 lb (90.7 kg) Social History Tobacco Use Smoking Status Never Smoker Smokeless Tobacco Never Used Medications, diet and exercise as means of diabetic control with a goal of an A1C of less than 7.0% discussed. Diabetic foot care and annual eye exam discussed as well. Check blood sugars while fasting just before breakfast on most days and occasionally before dinner. Write down readings in a diabetic log book and bring them to the next visit. Call the office for fasting sugars over 200 or below 75 on two or more occasions. Call immediately if having symptoms of high sugar (frequent urination, always thirsty) or low sugar (dizzy, lethargic, sweaty, nauseated, headache). Our overall goal is to reduce or eliminate the long term consequences of poorly controlled diabetes. Patient expresses understanding and agreement with our plan of care. Hypertension: The patient reports:  not taking medications regularly as instructed, no TIA's, no chest pain on exertion, no dyspnea on exertion, no swelling of ankles. BP Readings from Last 3 Encounters:  
04/29/19 154/77  
03/18/19 138/61  
06/15/18 139/63 Lab Results Component Value Date/Time Sodium 140 03/18/2019 02:17 PM  
 Potassium 4.4 03/18/2019 02:17 PM  
 Chloride 101 03/18/2019 02:17 PM  
 CO2 25 03/18/2019 02:17 PM  
 Anion gap 7 11/02/2010 12:20 PM  
 Glucose 192 (H) 03/18/2019 02:17 PM  
 BUN 9 03/18/2019 02:17 PM  
 Creatinine 0.95 03/18/2019 02:17 PM  
 BUN/Creatinine ratio 9 (L) 03/18/2019 02:17 PM  
 GFR est AA 83 03/18/2019 02:17 PM  
 GFR est non-AA 72 03/18/2019 02:17 PM  
 Calcium 8.8 03/18/2019 02:17 PM  
 
Patient advised to log blood pressures at home 2-3 times weekly and bring to next visit. Call office as soon as possible if BP's over 140/90 on multiple occasions or with symptoms of dizziness, chest pain, shortness of breath, headache or ankle swelling. Our goal is to normalize the blood pressure to decrease the risks of strokes and heart attacks. The patient is in agreement with the plan. Encounter Diagnoses Name Primary?  Type 2 diabetes with nephropathy ([facility] [address] Yes  Essential hypertension  Bilateral hearing loss, unspecified hearing loss type  Dementia without behavioral disturbance, unspecified dementia type Patient Active Problem List  
Diagnosis Code  DM type 2 (diabetes mellitus, type 2) (HCC) E11.9  
 HTN (hypertension) I10  
 Hyperlipemia E78.5  Peyronie disease N48.6  Anemia D64.9  PPD positive R76.11  
 Colon polyp K63.5  Type 2 diabetes with nephropathy (HCC) E11.21 Past Surgical History:  
Procedure Laterality Date  COLONOSCOPY  ENDOSCOPY, COLON, DIAGNOSTIC  2007 Patient instructed to return in 3 Years/over-due  HX HEENT Social History Socioeconomic History  Marital status:  Spouse name: Not on file  Number of children: Not on file  Years of education: Not on file  Highest education level: Not on file Occupational History  Not on file Social Needs  Financial resource strain: Not on file  Food insecurity:  
  Worry: Not on file Inability: Not on file  Transportation needs:  
  Medical: Not on file Non-medical: Not on file Tobacco Use  Smoking status: Never Smoker  Smokeless tobacco: Never Used Substance and Sexual Activity  Alcohol use: No  
 Drug use: No  
 Sexual activity: Not on file Lifestyle  Physical activity:  
  Days per week: Not on file Minutes per session: Not on file  Stress: Not on file Relationships  Social connections:  
  Talks on phone: Not on file Gets together: Not on file Attends Episcopal service: Not on file Active member of club or organization: Not on file Attends meetings of clubs or organizations: Not on file Relationship status: Not on file  Intimate partner violence:  
  Fear of current or ex partner: Not on file Emotionally abused: Not on file Physically abused: Not on file Forced sexual activity: Not on file Other Topics Concern  Not on file Social History Narrative  Not on file Family History Problem Relation Age of Onset  No Known Problems Mother  No Known Problems Father Current Outpatient Medications Medication Sig  
 memantine (NAMENDA) 10 mg tablet Take 1 Tab by mouth daily.  glipiZIDE SR (GLUCOTROL XL) 10 mg CR tablet TAKE 1 TABLET DAILY  pravastatin (PRAVACHOL) 20 mg tablet Take 1 Tab by mouth nightly.   
 SITagliptin-metFORMIN (JANUMET) 50-1,000 mg per tablet TAKE 1 TABLET TWICE A DAY WITH MEALS  
  naproxen (NAPROSYN) 375 mg tablet Take 1 Tab by mouth two (2) times daily (with meals). As needed for knee pain. No current facility-administered medications for this visit. No Known Allergies Review of Systems: 
Constitutional: Negative for fatigue, malaise Resp: Negative for cough, wheezing or SOB 
CV: Negative for chest pain, dizziness or palpitations GI: Negative for nausea or abdominal pain MS: Negative for acute myalgias or arthralgias Neuro: Negative for HA, weakness or paresthesia Psych: see HPI, Negative for depression or anxiety Vitals:  
 04/29/19 2445 BP: 154/77 Pulse: 63 Resp: 18 Temp: 97.8 °F (36.6 °C) TempSrc: Oral  
SpO2: 98% Weight: 193 lb (87.5 kg) Height: 5' 9\" (1.753 m) Physical Examination: 
General: Well developed, well nourished, in no acute distress Head: Normocephalic, atraumatic Eyes: Sclera clear, EOMI Neck: Normal range of motion Respiratory: symmetrical, unlabored effort Cardiovascular: Regular rate and rhythm Extremities: Full range of motion, normal gait Neurologic: No focal deficits Psych: Active, alert. Affect appropriate ICD-10-CM ICD-9-CM 1. 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  
  583.81   
2. Essential hypertension I10 401.9 3. Bilateral hearing loss, unspecified hearing loss type H91.93 389.9 REFERRAL TO AUDIOLOGY 4. Dementia without behavioral disturbance, unspecified dementia type F03.90 294.20 I have discussed the diagnosis with the patient  and the intended plan as seen in the above orders. The patient and granddaughter express understanding and agreement with our plan of care. All of the patient's questions were answered to apparent satisfaction. The patient has received an after-visit summary. The patient  and granddaughter know to call our office if there are any questions or concerns regarding diagnosis and treatment plans. I have discussed medication side effects and warnings with the patient and granddaughter as well. Over half of the 25 minutes face to face with Andie Smith consisted of counseling and discussing treatment plans in reference to his memory loss, medications and home safety. Future Appointments Date Time Provider Otoniel Kaplan 7/18/2019 10:40 AM Valerie Rios MD [facility] REGINO SCHED Signed By: Consuelo Ring MD   
 April 29, 2019 Follow-up and Dispositions · Return in about 3 months (around 7/29/2019), or if symptoms worsen or fail to improve.

## 2019-04-29 NOTE — PATIENT INSTRUCTIONS
Helping a Person With Alzheimer's Disease: Care Instructions Your Care Instructions Alzheimer's disease is a type of dementia. It affects memory, intelligence, judgment, language, and behavior. It is not clear what causes this disease. But it is the most common form of dementia in older adults. It may take many years to develop. Alzheimer's disease is different than mild memory loss that occurs with aging. Family members usually notice symptoms first. But the person also may realize that something is wrong. Follow-up care is a key part of your loved one's treatment and safety. Be sure to make and go to all appointments, and call your doctor if your loved one is having problems. It's also a good idea to know your loved one's test results and keep a list of the medicines he or she takes. How can you care for your loved one at home? · Develop a routine. The person will feel less frustrated or confused with a clear, simple daily plan. Remind him or her about important facts and events. · Be patient. It may take longer for the person to complete a task than it used to. · Help the person eat a balanced diet. Serve plenty of whole grains, fruits, and vegetables every day. If the person is not eating well at mealtimes, give snacks at midmorning and in the afternoon. Offer drinks such as Boost, Ensure, or Sustacal if he or she is losing weight. · Encourage exercise. Walking and other activity may slow the decline of mental ability. Help the person keep an active mind. Encourage hobbies such as reading and crossword puzzles. · Take steps to help if the person is sundowning. This is the restless behavior and trouble with sleeping that may occur in late afternoon and at night. Try not to let the person nap during the day. Offer a glass of warm milk or caffeine-free tea before bedtime. · Ask family members and friends for help. You may need breaks where others can help care for the person. · Talk to the person's doctor about what resources are available for help in your area. · Review all of the person's medicines with his or her doctor. · For as long as the person is able, allow him or her to make decisions about activities, food, clothing, and other choices. Let the person be independent, even if tasks take more time or are not done perfectly. Tailor tasks to the person's abilities. For example, if cooking is no longer safe, ask for other help. He or she can help set the table or make simple dishes such as a salad. When the person needs help, offer it gently. Keeping safe · Make your home (or the person's home) safe. Tack down rugs, and put no-slip tape in the tub. Install handrails, and put safety switches on stoves and appliances. Keep rooms free of clutter. Make sure walkways around furniture are clear. Do not move furniture around, because the person may become confused. · Use locks on doors and cupboards. Lock up knives, scissors, medicines, cleaning supplies, and other dangerous things. · Do not let the person drive or cook if he or she cannot do it safely. A person with Alzheimer's should not drive unless he or she is able to pass an on-road driving test. Your state 's license bureau can do a driving test if there is any question. · Get medical alert jewelry for the person so you can be contacted if he or she wanders away. If possible, provide a safe place for wandering, such as an enclosed yard or garden. When should you call for help? Call 911 anytime you think you may need emergency care.  For example, call if: 
  · A person who has Alzheimer's disease has disappeared.  
  · A person who has Alzheimer's disease is seriously injured.  
[facility] your doctor now or seek immediate medical care if: 
  · The person you are caring for suddenly sees or hears things that are not there (hallucinates).  
  · The person you are caring for has a sudden, drastic change in his or her behavior.  
 Watch closely for changes in your loved one's health, and be sure to contact the doctor if: 
  · A person who has Alzheimer's disease gradually gets worse or has symptoms that could cause injury.  
  · You need help caring for a person with Alzheimer's disease.  
  · The person has problems with his or her medicine. Where can you learn more? Go to http://sepideh-sol.info/. Enter K399 in the search box to learn more about \"Helping a Person With Alzheimer's Disease: Care Instructions. \" Current as of: September 11, 2018 Content Version: 11.9 © 7834-0186 [employer]. Care instructions adapted under license by [employer] (which disclaims liability or warranty for this information). If you have questions about a medical condition or this instruction, always ask your healthcare professional. [address] any warranty or liability for your use of this information.

## 2019-05-06 NOTE — TELEPHONE ENCOUNTER
Olvin Toribio, speech therapist, called to advise that pt glucometer does not work anymore. Pt has not been able to check his blood sugar for a week.  Pt is in need of a new one.    [phone]

## 2019-05-07 NOTE — TELEPHONE ENCOUNTER
Patient with T2D. He is not on insulin and has dementia. He does not need to check his BS. This was discussed with family member at last [address]